# Patient Record
Sex: MALE | Race: WHITE | NOT HISPANIC OR LATINO | Employment: FULL TIME | URBAN - METROPOLITAN AREA
[De-identification: names, ages, dates, MRNs, and addresses within clinical notes are randomized per-mention and may not be internally consistent; named-entity substitution may affect disease eponyms.]

---

## 2019-03-15 ENCOUNTER — APPOINTMENT (OUTPATIENT)
Dept: GENERAL RADIOLOGY | Facility: HOSPITAL | Age: 49
End: 2019-03-15

## 2019-03-15 ENCOUNTER — HOSPITAL ENCOUNTER (EMERGENCY)
Facility: HOSPITAL | Age: 49
Discharge: HOME | End: 2019-03-15
Attending: EMERGENCY MEDICINE | Admitting: EMERGENCY MEDICINE

## 2019-03-15 VITALS
HEIGHT: 67 IN | TEMPERATURE: 98.6 F | HEART RATE: 64 BPM | WEIGHT: 172 LBS | SYSTOLIC BLOOD PRESSURE: 121 MMHG | DIASTOLIC BLOOD PRESSURE: 83 MMHG | OXYGEN SATURATION: 97 % | BODY MASS INDEX: 27 KG/M2 | RESPIRATION RATE: 16 BRPM

## 2019-03-15 DIAGNOSIS — R07.9 CHEST PAIN, UNSPECIFIED TYPE: Primary | ICD-10-CM

## 2019-03-15 LAB
ALBUMIN SERPL-MCNC: 4.84 G/DL (ref 3.2–4.8)
ALBUMIN/GLOB SERPL: 2 G/DL (ref 1.5–2.5)
ALP SERPL-CCNC: 62 U/L (ref 25–100)
ALT SERPL W P-5'-P-CCNC: 18 U/L (ref 7–40)
ANION GAP SERPL CALCULATED.3IONS-SCNC: 10 MMOL/L (ref 3–11)
AST SERPL-CCNC: 20 U/L (ref 0–33)
BASOPHILS # BLD AUTO: 0.08 10*3/MM3 (ref 0–0.2)
BASOPHILS NFR BLD AUTO: 1 % (ref 0–1)
BILIRUB SERPL-MCNC: 1 MG/DL (ref 0.3–1.2)
BNP SERPL-MCNC: 5 PG/ML (ref 0–100)
BUN BLD-MCNC: 14 MG/DL (ref 9–23)
BUN/CREAT SERPL: 14.4 (ref 7–25)
CALCIUM SPEC-SCNC: 10 MG/DL (ref 8.7–10.4)
CHLORIDE SERPL-SCNC: 105 MMOL/L (ref 99–109)
CO2 SERPL-SCNC: 25 MMOL/L (ref 20–31)
CREAT BLD-MCNC: 0.97 MG/DL (ref 0.6–1.3)
DEPRECATED RDW RBC AUTO: 38.3 FL (ref 37–54)
EOSINOPHIL # BLD AUTO: 0.06 10*3/MM3 (ref 0–0.3)
EOSINOPHIL NFR BLD AUTO: 0.8 % (ref 0–3)
ERYTHROCYTE [DISTWIDTH] IN BLOOD BY AUTOMATED COUNT: 12.1 % (ref 11.3–14.5)
GFR SERPL CREATININE-BSD FRML MDRD: 82 ML/MIN/1.73
GLOBULIN UR ELPH-MCNC: 2.5 GM/DL
GLUCOSE BLD-MCNC: 97 MG/DL (ref 70–100)
HCT VFR BLD AUTO: 45.7 % (ref 38.9–50.9)
HGB BLD-MCNC: 16 G/DL (ref 13.1–17.5)
HOLD SPECIMEN: NORMAL
HOLD SPECIMEN: NORMAL
IMM GRANULOCYTES # BLD AUTO: 0.03 10*3/MM3 (ref 0–0.05)
IMM GRANULOCYTES NFR BLD AUTO: 0.4 % (ref 0–0.6)
LIPASE SERPL-CCNC: 43 U/L (ref 6–51)
LYMPHOCYTES # BLD AUTO: 2.28 10*3/MM3 (ref 0.6–4.8)
LYMPHOCYTES NFR BLD AUTO: 29.6 % (ref 24–44)
MCH RBC QN AUTO: 30.4 PG (ref 27–31)
MCHC RBC AUTO-ENTMCNC: 35 G/DL (ref 32–36)
MCV RBC AUTO: 86.9 FL (ref 80–99)
MONOCYTES # BLD AUTO: 0.46 10*3/MM3 (ref 0–1)
MONOCYTES NFR BLD AUTO: 6 % (ref 0–12)
NEUTROPHILS # BLD AUTO: 4.8 10*3/MM3 (ref 1.5–8.3)
NEUTROPHILS NFR BLD AUTO: 62.2 % (ref 41–71)
PLATELET # BLD AUTO: 242 10*3/MM3 (ref 150–450)
PMV BLD AUTO: 9.2 FL (ref 6–12)
POTASSIUM BLD-SCNC: 3.9 MMOL/L (ref 3.5–5.5)
PROT SERPL-MCNC: 7.3 G/DL (ref 5.7–8.2)
RBC # BLD AUTO: 5.26 10*6/MM3 (ref 4.2–5.76)
SODIUM BLD-SCNC: 140 MMOL/L (ref 132–146)
TROPONIN I SERPL-MCNC: <0.006 NG/ML
WBC NRBC COR # BLD: 7.71 10*3/MM3 (ref 3.5–10.8)
WHOLE BLOOD HOLD SPECIMEN: NORMAL
WHOLE BLOOD HOLD SPECIMEN: NORMAL

## 2019-03-15 PROCEDURE — 71045 X-RAY EXAM CHEST 1 VIEW: CPT

## 2019-03-15 PROCEDURE — 85025 COMPLETE CBC W/AUTO DIFF WBC: CPT | Performed by: EMERGENCY MEDICINE

## 2019-03-15 PROCEDURE — 84484 ASSAY OF TROPONIN QUANT: CPT | Performed by: EMERGENCY MEDICINE

## 2019-03-15 PROCEDURE — 80053 COMPREHEN METABOLIC PANEL: CPT | Performed by: EMERGENCY MEDICINE

## 2019-03-15 PROCEDURE — 83880 ASSAY OF NATRIURETIC PEPTIDE: CPT | Performed by: EMERGENCY MEDICINE

## 2019-03-15 PROCEDURE — 93005 ELECTROCARDIOGRAM TRACING: CPT | Performed by: EMERGENCY MEDICINE

## 2019-03-15 PROCEDURE — 83690 ASSAY OF LIPASE: CPT | Performed by: EMERGENCY MEDICINE

## 2019-03-15 PROCEDURE — 99285 EMERGENCY DEPT VISIT HI MDM: CPT

## 2019-03-15 RX ORDER — AMLODIPINE BESYLATE AND BENAZEPRIL HYDROCHLORIDE 5; 10 MG/1; MG/1
1 CAPSULE ORAL DAILY
COMMUNITY
End: 2022-07-06 | Stop reason: SDUPTHER

## 2019-03-15 RX ORDER — ASPIRIN 81 MG/1
324 TABLET, CHEWABLE ORAL ONCE
Status: COMPLETED | OUTPATIENT
Start: 2019-03-15 | End: 2019-03-15

## 2019-03-15 RX ORDER — SODIUM CHLORIDE 0.9 % (FLUSH) 0.9 %
10 SYRINGE (ML) INJECTION AS NEEDED
Status: DISCONTINUED | OUTPATIENT
Start: 2019-03-15 | End: 2019-03-15 | Stop reason: HOSPADM

## 2019-03-15 RX ADMIN — ASPIRIN 81 MG 243 MG: 81 TABLET ORAL at 10:00

## 2019-03-15 NOTE — ED PROVIDER NOTES
Subjective   Patient presents to the emergency department today complaining of having chest pain on and off for over 3 weeks.  Patient reports the pain has no idea what is been causing the pain it does not seem to be exacerbated with exertion or alleviated with rest necessarily.  He does have a history of reflux but lying flat does not seem to make this worse.  The pain does not radiate up into his neck or down into his arm.  He does ice occasionally associated with some nausea but does not have shortness of breath.  He had no previous cardiac testing.  He has no family history of cardiac disease under the age of 60.  He smoked greater than 4 years ago.  Does not use recreational drugs including crack or cocaine.  He has no pain at this time.  When the pain is present it usually last for several hours initially his pain this morning started at about 3 AM.        History provided by:  Patient   used: No    Chest Pain   Pain location:  Substernal area  Pain quality: dull    Pain radiates to:  Does not radiate  Onset quality:  Gradual  Timing:  Intermittent  Progression:  Waxing and waning  Chronicity:  New  Context: not breathing, not lifting, not movement, not at rest and not stress    Relieved by:  Nothing  Worsened by:  Nothing  Ineffective treatments:  None tried  Associated symptoms: heartburn    Associated symptoms: no abdominal pain, no anorexia, no anxiety, no back pain, no claudication, no cough, no dysphagia, no fever, no headache, no lower extremity edema, no nausea, no near-syncope, no numbness, no palpitations, no syncope and no weakness    Risk factors: male sex    Risk factors: no birth control, not obese and no smoking        Review of Systems   Constitutional: Negative for chills and fever.   HENT: Negative for trouble swallowing.    Respiratory: Negative for cough, chest tightness and wheezing.    Cardiovascular: Positive for chest pain. Negative for palpitations, claudication,  syncope and near-syncope.   Gastrointestinal: Positive for heartburn. Negative for abdominal pain, anorexia and nausea.   Genitourinary: Negative for dysuria, frequency and urgency.   Musculoskeletal: Negative for back pain and neck pain.   Skin: Negative for pallor and rash.   Neurological: Negative for weakness, numbness and headaches.   Hematological: Negative for adenopathy.   Psychiatric/Behavioral: Negative.    All other systems reviewed and are negative.      Past Medical History:   Diagnosis Date   • Cat scratch fever    • Hypertension        Allergies   Allergen Reactions   • Morphine GI Intolerance       History reviewed. No pertinent surgical history.    History reviewed. No pertinent family history.    Social History     Socioeconomic History   • Marital status:      Spouse name: Not on file   • Number of children: Not on file   • Years of education: Not on file   • Highest education level: Not on file   Tobacco Use   • Smoking status: Former Smoker   Substance and Sexual Activity   • Alcohol use: Yes     Comment: occasionally   • Drug use: No   • Sexual activity: Defer           Objective   Physical Exam   Constitutional: He is oriented to person, place, and time. He appears well-developed and well-nourished.   HENT:   Head: Normocephalic and atraumatic.   Right Ear: External ear normal.   Left Ear: External ear normal.   Nose: Nose normal.   Mouth/Throat: Oropharynx is clear and moist.   Eyes: Conjunctivae and EOM are normal. Pupils are equal, round, and reactive to light. No scleral icterus.   Neck: Normal range of motion. No thyromegaly present.   Cardiovascular: Normal rate, regular rhythm, normal heart sounds and normal pulses.   Pulmonary/Chest: Effort normal and breath sounds normal. No respiratory distress. He has no decreased breath sounds. He has no wheezes. He has no rhonchi. He has no rales. He exhibits no tenderness.   Abdominal: Soft. Bowel sounds are normal. He exhibits no  distension. There is no tenderness.   Musculoskeletal: Normal range of motion.   Lymphadenopathy:     He has no cervical adenopathy.   Neurological: He is alert and oriented to person, place, and time. He has normal reflexes. He displays normal reflexes. No cranial nerve deficit. Coordination normal.   Skin: Skin is warm and dry.   Psychiatric: He has a normal mood and affect. His behavior is normal. Judgment and thought content normal.   Nursing note and vitals reviewed.      Procedures           ED Course        Recent Results (from the past 24 hour(s))   Comprehensive Metabolic Panel    Collection Time: 03/15/19  9:57 AM   Result Value Ref Range    Glucose 97 70 - 100 mg/dL    BUN 14 9 - 23 mg/dL    Creatinine 0.97 0.60 - 1.30 mg/dL    Sodium 140 132 - 146 mmol/L    Potassium 3.9 3.5 - 5.5 mmol/L    Chloride 105 99 - 109 mmol/L    CO2 25.0 20.0 - 31.0 mmol/L    Calcium 10.0 8.7 - 10.4 mg/dL    Total Protein 7.3 5.7 - 8.2 g/dL    Albumin 4.84 (H) 3.20 - 4.80 g/dL    ALT (SGPT) 18 7 - 40 U/L    AST (SGOT) 20 0 - 33 U/L    Alkaline Phosphatase 62 25 - 100 U/L    Total Bilirubin 1.0 0.3 - 1.2 mg/dL    eGFR Non African Amer 82 >60 mL/min/1.73    Globulin 2.5 gm/dL    A/G Ratio 2.0 1.5 - 2.5 g/dL    BUN/Creatinine Ratio 14.4 7.0 - 25.0    Anion Gap 10.0 3.0 - 11.0 mmol/L   Lipase    Collection Time: 03/15/19  9:57 AM   Result Value Ref Range    Lipase 43 6 - 51 U/L   BNP    Collection Time: 03/15/19  9:57 AM   Result Value Ref Range    BNP 5.0 0.0 - 100.0 pg/mL   Light Blue Top    Collection Time: 03/15/19  9:57 AM   Result Value Ref Range    Extra Tube hold for add-on    Green Top (Gel)    Collection Time: 03/15/19  9:57 AM   Result Value Ref Range    Extra Tube Hold for add-ons.    Lavender Top    Collection Time: 03/15/19  9:57 AM   Result Value Ref Range    Extra Tube hold for add-on    Gold Top - SST    Collection Time: 03/15/19  9:57 AM   Result Value Ref Range    Extra Tube Hold for add-ons.    CBC Auto  Differential    Collection Time: 03/15/19  9:57 AM   Result Value Ref Range    WBC 7.71 3.50 - 10.80 10*3/mm3    RBC 5.26 4.20 - 5.76 10*6/mm3    Hemoglobin 16.0 13.1 - 17.5 g/dL    Hematocrit 45.7 38.9 - 50.9 %    MCV 86.9 80.0 - 99.0 fL    MCH 30.4 27.0 - 31.0 pg    MCHC 35.0 32.0 - 36.0 g/dL    RDW 12.1 11.3 - 14.5 %    RDW-SD 38.3 37.0 - 54.0 fl    MPV 9.2 6.0 - 12.0 fL    Platelets 242 150 - 450 10*3/mm3    Neutrophil % 62.2 41.0 - 71.0 %    Lymphocyte % 29.6 24.0 - 44.0 %    Monocyte % 6.0 0.0 - 12.0 %    Eosinophil % 0.8 0.0 - 3.0 %    Basophil % 1.0 0.0 - 1.0 %    Immature Grans % 0.4 0.0 - 0.6 %    Neutrophils, Absolute 4.80 1.50 - 8.30 10*3/mm3    Lymphocytes, Absolute 2.28 0.60 - 4.80 10*3/mm3    Monocytes, Absolute 0.46 0.00 - 1.00 10*3/mm3    Eosinophils, Absolute 0.06 0.00 - 0.30 10*3/mm3    Basophils, Absolute 0.08 0.00 - 0.20 10*3/mm3    Immature Grans, Absolute 0.03 0.00 - 0.05 10*3/mm3   Troponin    Collection Time: 03/15/19  9:57 AM   Result Value Ref Range    Troponin I <0.006 <=0.039 ng/mL     Note: In addition to lab results from this visit, the labs listed above may include labs taken at another facility or during a different encounter within the last 24 hours. Please correlate lab times with ED admission and discharge times for further clarification of the services performed during this visit.    XR Chest 1 View   Final Result   No acute finding.       D:  03/15/2019   E:  03/15/2019       This report was finalized on 3/15/2019 10:09 AM by Wander Harris.            Vitals:    03/15/19 1230 03/15/19 1231 03/15/19 1300 03/15/19 1330   BP: 123/81  114/81 121/83   BP Location:       Patient Position:       Pulse:  72 67 64   Resp:   16    Temp:       TempSrc:       SpO2:  98% 98% 97%   Weight:       Height:         Medications   aspirin chewable tablet 324 mg (243 mg Oral Given 3/15/19 1000)     ECG/EMG Results (last 24 hours)     Procedure Component Value Units Date/Time    ECG 12 Lead [[id number]]  Collected:  03/15/19 0928     Updated:  03/15/19 0957    Narrative:       Test Reason : chest pain  Blood Pressure : **/** mmHG  Vent. Rate : 068 BPM     Atrial Rate : 068 BPM     P-R Int : 136 ms          QRS Dur : 088 ms      QT Int : 400 ms       P-R-T Axes : 046 021 027 degrees     QTc Int : 425 ms    Normal sinus rhythm with sinus arrhythmia  No previous ECGs available  Confirmed by RENATO HUMPHREYS MD ([id number]) on 3/15/2019 9:57:23 AM    Referred By:  ED MD           Confirmed By:RENATO HUMPHREYS MD        ECG 12 Lead   Final Result   Test Reason : chest pain   Blood Pressure : **/** mmHG   Vent. Rate : 068 BPM     Atrial Rate : 068 BPM      P-R Int : 136 ms          QRS Dur : 088 ms       QT Int : 400 ms       P-R-T Axes : 046 021 027 degrees      QTc Int : 425 ms      Normal sinus rhythm with sinus arrhythmia   No previous ECGs available   Confirmed by RENATO HUMPHREYS MD ([id number]) on 3/15/2019 9:57:23 AM      Referred By:  ED MD           Confirmed By:RENATO HUMPHREYS MD                HEART Score (for prediction of 6-week risk of major adverse cardiac event) reviewed and/or performed as part of the patient evaluation and treatment planning process.  The result associated with this review/performance is: 2       MDM  Number of Diagnoses or Management Options  Chest pain, unspecified type: new and requires workup     Amount and/or Complexity of Data Reviewed  Clinical lab tests: reviewed and ordered  Tests in the radiology section of CPT®: reviewed and ordered  Tests in the medicine section of CPT®: reviewed and ordered  Discuss the patient with other providers: yes    Patient Progress  Patient progress: stable        Final diagnoses:   Chest pain, unspecified type            Reid Booker PA  03/16/19 0714

## 2019-03-20 ENCOUNTER — OFFICE VISIT (OUTPATIENT)
Dept: CARDIOLOGY | Facility: HOSPITAL | Age: 49
End: 2019-03-20

## 2019-03-20 ENCOUNTER — HOSPITAL ENCOUNTER (OUTPATIENT)
Dept: CARDIOLOGY | Facility: HOSPITAL | Age: 49
Discharge: HOME | End: 2019-03-20
Admitting: NURSE PRACTITIONER

## 2019-03-20 VITALS
DIASTOLIC BLOOD PRESSURE: 79 MMHG | HEIGHT: 67 IN | BODY MASS INDEX: 26.84 KG/M2 | TEMPERATURE: 97.1 F | OXYGEN SATURATION: 99 % | WEIGHT: 171 LBS | HEART RATE: 92 BPM | SYSTOLIC BLOOD PRESSURE: 121 MMHG | RESPIRATION RATE: 16 BRPM

## 2019-03-20 DIAGNOSIS — R07.89 CHEST PAIN, ATYPICAL: Primary | ICD-10-CM

## 2019-03-20 DIAGNOSIS — I10 ESSENTIAL HYPERTENSION: ICD-10-CM

## 2019-03-20 DIAGNOSIS — R10.13 DYSPEPSIA: ICD-10-CM

## 2019-03-20 DIAGNOSIS — R07.89 CHEST PAIN, ATYPICAL: ICD-10-CM

## 2019-03-20 LAB
BH CV STRESS BP STAGE 1: NORMAL
BH CV STRESS BP STAGE 2: NORMAL
BH CV STRESS BP STAGE 3: NORMAL
BH CV STRESS DURATION MIN STAGE 1: 3
BH CV STRESS DURATION MIN STAGE 2: 3
BH CV STRESS DURATION MIN STAGE 3: 3
BH CV STRESS DURATION MIN STAGE 4: 0
BH CV STRESS DURATION SEC STAGE 1: 0
BH CV STRESS DURATION SEC STAGE 2: 0
BH CV STRESS DURATION SEC STAGE 3: 0
BH CV STRESS DURATION SEC STAGE 4: 36
BH CV STRESS GRADE STAGE 1: 10
BH CV STRESS GRADE STAGE 2: 12
BH CV STRESS GRADE STAGE 3: 14
BH CV STRESS GRADE STAGE 4: 16
BH CV STRESS HR STAGE 1: 139
BH CV STRESS HR STAGE 2: 162
BH CV STRESS HR STAGE 3: 176
BH CV STRESS HR STAGE 4: 187
BH CV STRESS METS STAGE 1: 5
BH CV STRESS METS STAGE 2: 7.5
BH CV STRESS METS STAGE 3: 10
BH CV STRESS METS STAGE 4: 13.5
BH CV STRESS PROTOCOL 1: NORMAL
BH CV STRESS RECOVERY BP: NORMAL MMHG
BH CV STRESS RECOVERY HR: 117 BPM
BH CV STRESS SPEED STAGE 1: 1.7
BH CV STRESS SPEED STAGE 2: 2.5
BH CV STRESS SPEED STAGE 3: 3.4
BH CV STRESS SPEED STAGE 4: 4.2
BH CV STRESS STAGE 1: 1
BH CV STRESS STAGE 2: 2
BH CV STRESS STAGE 3: 3
BH CV STRESS STAGE 4: 4
MAXIMAL PREDICTED HEART RATE: 171 BPM
PERCENT MAX PREDICTED HR: 109.36 %
STRESS BASELINE BP: NORMAL MMHG
STRESS BASELINE HR: 82 BPM
STRESS PERCENT HR: 129 %
STRESS POST ESTIMATED WORKLOAD: 11 METS
STRESS POST EXERCISE DUR MIN: 9 MIN
STRESS POST EXERCISE DUR SEC: 36 SEC
STRESS POST PEAK BP: NORMAL MMHG
STRESS POST PEAK HR: 187 BPM
STRESS TARGET HR: 145 BPM

## 2019-03-20 PROCEDURE — 93017 CV STRESS TEST TRACING ONLY: CPT

## 2019-03-20 PROCEDURE — 93018 CV STRESS TEST I&R ONLY: CPT | Performed by: INTERNAL MEDICINE

## 2019-03-20 PROCEDURE — 99214 OFFICE O/P EST MOD 30 MIN: CPT | Performed by: NURSE PRACTITIONER

## 2019-03-20 RX ORDER — ASPIRIN 81 MG/1
81 TABLET, CHEWABLE ORAL DAILY
COMMUNITY
End: 2019-03-29 | Stop reason: HOSPADM

## 2019-03-20 RX ORDER — OMEPRAZOLE 20 MG/1
20 CAPSULE, DELAYED RELEASE ORAL DAILY PRN
COMMUNITY
End: 2022-07-06 | Stop reason: SDUPTHER

## 2019-03-20 NOTE — PROGRESS NOTES
[facility]  Heart and Valve Center      Encounter Date:03/20/2019     Sunil Nolasco  [address] [place] [address]  [unfilled]    1970    Arnulfo Snowden MD    Sunil Nolasco is a 49 y.o. male.      Subjective:     Chief Complaint:  Chest Pain (s/p ED visit)       HPI     49-year-old male presented to [facility] ED on 3/15/2019 with complaints of chest pain for greater than 3 weeks. Pressure. Associated fatigue over the last several weeks.  Not exacerbated by exertion or relieved with rest. Pt reports BP has been elevated (150/98)  Normally well controlled. History of GERD, but symptoms are not worse when lying down.  Denies radiation of chest pain.  Reports occasional nausea.  Denies dyspnea.  No history of previous cardiac testing.  No family history of premature CAD in first-degree relative.  History of tobacco use greater than 4 years. Pt continued to [place]  until ED visit.    Denies recreational drug use, excessive alcohol intake.  When pain is present will usually last for several hours.      Cardiac risk factors:  History of  hypertension  Tobacco use (former), gender           History reviewed. No pertinent surgical history.    Allergies   Allergen Reactions   • Morphine GI Intolerance         Current Outpatient Medications:   •  aspirin 81 MG chewable tablet, Chew 81 mg Daily., Disp: , Rfl:   •  omeprazole (priLOSEC) 20 MG capsule, Take 20 mg by mouth Daily As Needed., Disp: , Rfl:   •  amLODIPine-benazepril (LOTREL 5-10) 5-10 MG per capsule, Take 1 capsule by mouth Daily., Disp: , Rfl:     The following portions of the patient's history were reviewed and updated as appropriate: allergies, current medications, past family history, past medical history, past social history, past surgical history and problem list.    Review of Systems   Constitution: Positive for malaise/fatigue.   Cardiovascular: Positive for chest pain.   Gastrointestinal: Positive for heartburn.  "  Psychiatric/Behavioral: The patient is nervous/anxious.    All other systems reviewed and are negative.      Objective:     Vitals:    03/20/19 0948 03/20/19 0952 03/20/19 0953   BP: 120/81 128/82 121/79   BP Location: Right arm Left arm Left arm   Patient Position: Sitting Sitting Standing   Pulse: 69  92   Resp: 16     Temp: 97.1 °F (36.2 °C)     TempSrc: Temporal     SpO2: 99%     Weight: 77.6 kg (171 lb)     Height: 170.2 cm (67\")           Physical Exam   Constitutional: He is oriented to person, place, and time. He appears well-developed and well-nourished. No distress.   HENT:   Head: Normocephalic and atraumatic.   Mouth/Throat: Oropharynx is clear and moist.   Eyes: Conjunctivae are normal. Pupils are equal, round, and reactive to light. No scleral icterus.   Neck: No hepatojugular reflux and no JVD present. Carotid bruit is not present. No tracheal deviation present. No thyromegaly present.   Cardiovascular: Normal rate, regular rhythm, normal heart sounds and intact distal pulses. Exam reveals no friction rub.   No murmur heard.  Pulmonary/Chest: Effort normal and breath sounds normal.   Abdominal: Soft. Bowel sounds are normal. He exhibits no distension. There is no tenderness.   Musculoskeletal: He exhibits no edema.   Lymphadenopathy:     He has no cervical adenopathy.   Neurological: He is alert and oriented to person, place, and time.   Skin: Skin is warm, dry and intact. No rash noted. No cyanosis or erythema. No pallor.   Psychiatric: He has a normal mood and affect. His behavior is normal. Thought content normal.   Vitals reviewed.      Lab and Diagnostic Review:  Admission on 03/15/2019, Discharged on 03/15/2019   Component Date Value Ref Range Status   • Glucose 03/15/2019 97  70 - 100 mg/dL Final   • BUN 03/15/2019 14  9 - 23 mg/dL Final   • Creatinine 03/15/2019 0.97  0.60 - 1.30 mg/dL Final   • Sodium 03/15/2019 140  132 - 146 mmol/L Final   • Potassium 03/15/2019 3.9  3.5 - 5.5 mmol/L " Final   • Chloride 03/15/2019 105  99 - 109 mmol/L Final   • CO2 03/15/2019 25.0  20.0 - 31.0 mmol/L Final   • Calcium 03/15/2019 10.0  8.7 - 10.4 mg/dL Final   • Total Protein 03/15/2019 7.3  5.7 - 8.2 g/dL Final   • Albumin 03/15/2019 4.84* 3.20 - 4.80 g/dL Final   • ALT (SGPT) 03/15/2019 18  7 - 40 U/L Final   • AST (SGOT) 03/15/2019 20  0 - 33 U/L Final   • Alkaline Phosphatase 03/15/2019 62  25 - 100 U/L Final   • Total Bilirubin 03/15/2019 1.0  0.3 - 1.2 mg/dL Final   • eGFR Non African Amer 03/15/2019 82  >60 mL/min/1.73 Final   • Globulin 03/15/2019 2.5  gm/dL Final   • A/G Ratio 03/15/2019 2.0  1.5 - 2.5 g/dL Final   • BUN/Creatinine Ratio 03/15/2019 14.4  7.0 - 25.0 Final   • Anion Gap 03/15/2019 10.0  3.0 - 11.0 mmol/L Final   • Lipase 03/15/2019 43  6 - 51 U/L Final   • BNP 03/15/2019 5.0  0.0 - 100.0 pg/mL Final    Results may be falsely decreased if patient taking Biotin.   • Extra Tube 03/15/2019 hold for add-on   Final    Auto resulted   • Extra Tube 03/15/2019 Hold for add-ons.   Final    Auto resulted.   • Extra Tube 03/15/2019 hold for add-on   Final    Auto resulted   • Extra Tube 03/15/2019 Hold for add-ons.   Final    Auto resulted.   • WBC 03/15/2019 7.71  3.50 - 10.80 10*3/mm3 Final   • RBC 03/15/2019 5.26  4.20 - 5.76 10*6/mm3 Final   • Hemoglobin 03/15/2019 16.0  13.1 - 17.5 g/dL Final   • Hematocrit 03/15/2019 45.7  38.9 - 50.9 % Final   • MCV 03/15/2019 86.9  80.0 - 99.0 fL Final   • MCH 03/15/2019 30.4  27.0 - 31.0 pg Final   • MCHC 03/15/2019 35.0  32.0 - 36.0 g/dL Final   • RDW 03/15/2019 12.1  11.3 - 14.5 % Final   • RDW-SD 03/15/2019 38.3  37.0 - 54.0 fl Final   • MPV 03/15/2019 9.2  6.0 - 12.0 fL Final   • Platelets 03/15/2019 242  150 - 450 10*3/mm3 Final   • Neutrophil % 03/15/2019 62.2  41.0 - 71.0 % Final   • Lymphocyte % 03/15/2019 29.6  24.0 - 44.0 % Final   • Monocyte % 03/15/2019 6.0  0.0 - 12.0 % Final   • Eosinophil % 03/15/2019 0.8  0.0 - 3.0 % Final   • Basophil %  03/15/2019 1.0  0.0 - 1.0 % Final   • Immature Grans % 03/15/2019 0.4  0.0 - 0.6 % Final   • Neutrophils, Absolute 03/15/2019 4.80  1.50 - 8.30 10*3/mm3 Final   • Lymphocytes, Absolute 03/15/2019 2.28  0.60 - 4.80 10*3/mm3 Final   • Monocytes, Absolute 03/15/2019 0.46  0.00 - 1.00 10*3/mm3 Final   • Eosinophils, Absolute 03/15/2019 0.06  0.00 - 0.30 10*3/mm3 Final   • Basophils, Absolute 03/15/2019 0.08  0.00 - 0.20 10*3/mm3 Final   • Immature Grans, Absolute 03/15/2019 0.03  0.00 - 0.05 10*3/mm3 Final   • Troponin I 03/15/2019 <0.006  <=0.039 ng/mL Final    Results may be falsely decreased if patient taking Biotin.     EKG 3/15/2019: Normal sinus rhythm with sinus arrhythmia 68 bpm    Chest x-ray 3/15/2019: No acute cardiopulmonary processes  Assessment and Plan:         1. Chest pain, atypical    - Treadmill Stress Test; Future    2. Essential hypertension  Controlled today  Amlodipine-benazepril  Continue home BP and HR log  If elevated will adjust BP meds  Discussed BP goals    3. Dyspepsia  Instructed to use PPI daily    F/u pending test results.         *Please note that portions of this note were completed with a voice recognition program. Efforts were made to edit the dictations, but occasionally words are mistranscribed.

## 2019-03-21 DIAGNOSIS — R94.39 ABNORMAL STRESS ECG WITH TREADMILL: ICD-10-CM

## 2019-03-21 DIAGNOSIS — R07.89 CHEST PAIN, ATYPICAL: Primary | ICD-10-CM

## 2019-03-21 RX ORDER — ATORVASTATIN CALCIUM 40 MG/1
40 TABLET, FILM COATED ORAL NIGHTLY
Qty: 30 TABLET | Refills: 1 | Status: SHIPPED | OUTPATIENT
Start: 2019-03-21 | End: 2019-03-29 | Stop reason: HOSPADM

## 2019-03-21 RX ORDER — METOPROLOL SUCCINATE 25 MG/1
25 TABLET, EXTENDED RELEASE ORAL DAILY
Qty: 30 TABLET | Refills: 1 | Status: SHIPPED | OUTPATIENT
Start: 2019-03-21 | End: 2019-05-16 | Stop reason: SDUPTHER

## 2019-03-21 NOTE — PROGRESS NOTES
Called and reviewed stress test results and Dr. Howard's recommendations.    Stress test abnormal.  Pt agrees to scheduled LHC.  Pt will be started on statin and BB.      [facility] will contact to schedule LHC

## 2019-03-25 ENCOUNTER — PREP FOR SURGERY (OUTPATIENT)
Dept: OTHER | Facility: HOSPITAL | Age: 49
End: 2019-03-25

## 2019-03-25 DIAGNOSIS — R94.39 ABNORMAL STRESS TEST: Primary | ICD-10-CM

## 2019-03-25 RX ORDER — SODIUM CHLORIDE 0.9 % (FLUSH) 0.9 %
3 SYRINGE (ML) INJECTION EVERY 12 HOURS SCHEDULED
Status: CANCELLED | OUTPATIENT
Start: 2019-03-25

## 2019-03-25 RX ORDER — SODIUM CHLORIDE 9 MG/ML
3 INJECTION, SOLUTION INTRAVENOUS CONTINUOUS
Status: CANCELLED | OUTPATIENT
Start: 2019-03-25 | End: 2019-03-25

## 2019-03-25 RX ORDER — ONDANSETRON 2 MG/ML
4 INJECTION INTRAMUSCULAR; INTRAVENOUS EVERY 6 HOURS PRN
Status: CANCELLED | OUTPATIENT
Start: 2019-03-25

## 2019-03-25 RX ORDER — LIDOCAINE HYDROCHLORIDE 10 MG/ML
0.1 INJECTION, SOLUTION EPIDURAL; INFILTRATION; INTRACAUDAL; PERINEURAL ONCE AS NEEDED
Status: CANCELLED | OUTPATIENT
Start: 2019-03-25

## 2019-03-25 RX ORDER — ASPIRIN 81 MG/1
324 TABLET, CHEWABLE ORAL ONCE
Status: CANCELLED | OUTPATIENT
Start: 2019-03-25 | End: 2019-03-25

## 2019-03-25 RX ORDER — SODIUM CHLORIDE 0.9 % (FLUSH) 0.9 %
3-10 SYRINGE (ML) INJECTION AS NEEDED
Status: CANCELLED | OUTPATIENT
Start: 2019-03-25

## 2019-03-25 RX ORDER — ASPIRIN 81 MG/1
81 TABLET ORAL DAILY
Status: CANCELLED | OUTPATIENT
Start: 2019-03-26

## 2019-03-29 ENCOUNTER — TELEPHONE (OUTPATIENT)
Dept: CARDIOLOGY | Facility: CLINIC | Age: 49
End: 2019-03-29

## 2019-03-29 ENCOUNTER — APPOINTMENT (OUTPATIENT)
Dept: CARDIOLOGY | Facility: HOSPITAL | Age: 49
End: 2019-03-29

## 2019-03-29 ENCOUNTER — HOSPITAL ENCOUNTER (OUTPATIENT)
Facility: HOSPITAL | Age: 49
Discharge: HOME | End: 2019-03-29
Attending: INTERNAL MEDICINE | Admitting: INTERNAL MEDICINE

## 2019-03-29 VITALS
OXYGEN SATURATION: 94 % | DIASTOLIC BLOOD PRESSURE: 72 MMHG | HEIGHT: 67 IN | BODY MASS INDEX: 26.37 KG/M2 | WEIGHT: 168 LBS | SYSTOLIC BLOOD PRESSURE: 108 MMHG | HEART RATE: 63 BPM | RESPIRATION RATE: 18 BRPM

## 2019-03-29 DIAGNOSIS — R07.89 CHEST PAIN, ATYPICAL: ICD-10-CM

## 2019-03-29 DIAGNOSIS — R94.39 ABNORMAL STRESS ECG WITH TREADMILL: ICD-10-CM

## 2019-03-29 DIAGNOSIS — R94.39 ABNORMAL STRESS TEST: ICD-10-CM

## 2019-03-29 LAB
ANION GAP SERPL CALCULATED.3IONS-SCNC: 9 MMOL/L (ref 3–11)
ARTICHOKE IGE QN: 37 MG/DL (ref 0–130)
BH CV ECHO MEAS - AO ROOT AREA (BSA CORRECTED): 1.7
BH CV ECHO MEAS - AO ROOT AREA: 7.8 CM^2
BH CV ECHO MEAS - AO ROOT DIAM: 3.2 CM
BH CV ECHO MEAS - BSA(HAYCOCK): 1.9 M^2
BH CV ECHO MEAS - BSA: 1.9 M^2
BH CV ECHO MEAS - BZI_BMI: 26.3 KILOGRAMS/M^2
BH CV ECHO MEAS - BZI_METRIC_HEIGHT: 170.2 CM
BH CV ECHO MEAS - BZI_METRIC_WEIGHT: 76.2 KG
BH CV ECHO MEAS - EDV(CUBED): 76.2 ML
BH CV ECHO MEAS - EDV(MOD-SP2): 77 ML
BH CV ECHO MEAS - EDV(MOD-SP4): 71 ML
BH CV ECHO MEAS - EDV(TEICH): 80.3 ML
BH CV ECHO MEAS - EF(CUBED): 75.9 %
BH CV ECHO MEAS - EF(MOD-BP): 64 %
BH CV ECHO MEAS - EF(MOD-SP2): 64.9 %
BH CV ECHO MEAS - EF(MOD-SP4): 62 %
BH CV ECHO MEAS - EF(TEICH): 68.3 %
BH CV ECHO MEAS - ESV(CUBED): 18.3 ML
BH CV ECHO MEAS - ESV(MOD-SP2): 27 ML
BH CV ECHO MEAS - ESV(MOD-SP4): 27 ML
BH CV ECHO MEAS - ESV(TEICH): 25.5 ML
BH CV ECHO MEAS - FS: 37.8 %
BH CV ECHO MEAS - IVS/LVPW: 0.99
BH CV ECHO MEAS - IVSD: 0.78 CM
BH CV ECHO MEAS - LAD MAJOR: 4.1 CM
BH CV ECHO MEAS - LAT PEAK E' VEL: 11.6 CM/SEC
BH CV ECHO MEAS - LATERAL E/E' RATIO: 6.1
BH CV ECHO MEAS - LV DIASTOLIC VOL/BSA (35-75): 37.8 ML/M^2
BH CV ECHO MEAS - LV MASS(C)D: 100.7 GRAMS
BH CV ECHO MEAS - LV MASS(C)DI: 53.6 GRAMS/M^2
BH CV ECHO MEAS - LV SYSTOLIC VOL/BSA (12-30): 14.4 ML/M^2
BH CV ECHO MEAS - LVIDD: 4.2 CM
BH CV ECHO MEAS - LVIDS: 2.6 CM
BH CV ECHO MEAS - LVLD AP2: 8.1 CM
BH CV ECHO MEAS - LVLD AP4: 8.4 CM
BH CV ECHO MEAS - LVLS AP2: 7.2 CM
BH CV ECHO MEAS - LVLS AP4: 7 CM
BH CV ECHO MEAS - LVPWD: 0.79 CM
BH CV ECHO MEAS - MED PEAK E' VEL: 10.5 CM/SEC
BH CV ECHO MEAS - MEDIAL E/E' RATIO: 6.8
BH CV ECHO MEAS - MV A MAX VEL: 55.8 CM/SEC
BH CV ECHO MEAS - MV DEC TIME: 0.3 SEC
BH CV ECHO MEAS - MV E MAX VEL: 72.1 CM/SEC
BH CV ECHO MEAS - MV E/A: 1.3
BH CV ECHO MEAS - PA ACC SLOPE: 532.7 CM/SEC^2
BH CV ECHO MEAS - PA ACC TIME: 0.11 SEC
BH CV ECHO MEAS - PA PR(ACCEL): 31.5 MMHG
BH CV ECHO MEAS - PI END-D VEL: 74.7 CM/SEC
BH CV ECHO MEAS - SI(CUBED): 30.8 ML/M^2
BH CV ECHO MEAS - SI(MOD-SP2): 26.6 ML/M^2
BH CV ECHO MEAS - SI(MOD-SP4): 23.4 ML/M^2
BH CV ECHO MEAS - SI(TEICH): 29.2 ML/M^2
BH CV ECHO MEAS - SV(CUBED): 57.8 ML
BH CV ECHO MEAS - SV(MOD-SP2): 50 ML
BH CV ECHO MEAS - SV(MOD-SP4): 44 ML
BH CV ECHO MEAS - SV(TEICH): 54.8 ML
BH CV ECHO MEAS - TAPSE (>1.6): 2.2 CM2
BH CV ECHO MEASUREMENTS AVERAGE E/E' RATIO: 6.52
BH CV VAS BP RIGHT ARM: NORMAL MMHG
BH CV XLRA - RV BASE: 3.5 CM
BH CV XLRA - RV LENGTH: 7.7 CM
BH CV XLRA - RV MID: 3.3 CM
BH CV XLRA - TDI S': 12.5 CM/SEC
BUN BLD-MCNC: 12 MG/DL (ref 9–23)
BUN/CREAT SERPL: 12.5 (ref 7–25)
CALCIUM SPEC-SCNC: 9.6 MG/DL (ref 8.7–10.4)
CHLORIDE SERPL-SCNC: 104 MMOL/L (ref 99–109)
CHOLEST SERPL-MCNC: 96 MG/DL (ref 0–200)
CO2 SERPL-SCNC: 27 MMOL/L (ref 20–31)
CREAT BLD-MCNC: 0.96 MG/DL (ref 0.6–1.3)
DEPRECATED RDW RBC AUTO: 39.5 FL (ref 37–54)
ERYTHROCYTE [DISTWIDTH] IN BLOOD BY AUTOMATED COUNT: 12.4 % (ref 11.3–14.5)
GFR SERPL CREATININE-BSD FRML MDRD: 83 ML/MIN/1.73
GLUCOSE BLD-MCNC: 95 MG/DL (ref 70–100)
HCT VFR BLD AUTO: 43.6 % (ref 38.9–50.9)
HDLC SERPL-MCNC: 45 MG/DL (ref 40–60)
HGB BLD-MCNC: 14.8 G/DL (ref 13.1–17.5)
LEFT ATRIUM VOLUME INDEX: 18.1 ML/M^2
LEFT ATRIUM VOLUME: 34 ML
MCH RBC QN AUTO: 30.3 PG (ref 27–31)
MCHC RBC AUTO-ENTMCNC: 33.9 G/DL (ref 32–36)
MCV RBC AUTO: 89.2 FL (ref 80–99)
PLATELET # BLD AUTO: 250 10*3/MM3 (ref 150–450)
PMV BLD AUTO: 10.3 FL (ref 6–12)
POTASSIUM BLD-SCNC: 3.7 MMOL/L (ref 3.5–5.5)
RBC # BLD AUTO: 4.89 10*6/MM3 (ref 4.2–5.76)
SODIUM BLD-SCNC: 140 MMOL/L (ref 132–146)
TRIGL SERPL-MCNC: 45 MG/DL (ref 0–150)
WBC NRBC COR # BLD: 8.09 10*3/MM3 (ref 3.5–10.8)

## 2019-03-29 PROCEDURE — 93458 L HRT ARTERY/VENTRICLE ANGIO: CPT | Performed by: INTERNAL MEDICINE

## 2019-03-29 PROCEDURE — 85027 COMPLETE CBC AUTOMATED: CPT | Performed by: NURSE PRACTITIONER

## 2019-03-29 PROCEDURE — 80061 LIPID PANEL: CPT | Performed by: NURSE PRACTITIONER

## 2019-03-29 PROCEDURE — 93010 ELECTROCARDIOGRAM REPORT: CPT | Performed by: INTERNAL MEDICINE

## 2019-03-29 PROCEDURE — 93306 TTE W/DOPPLER COMPLETE: CPT

## 2019-03-29 PROCEDURE — C1894 INTRO/SHEATH, NON-LASER: HCPCS | Performed by: INTERNAL MEDICINE

## 2019-03-29 PROCEDURE — 25010000002 FENTANYL CITRATE (PF) 100 MCG/2ML SOLUTION: Performed by: INTERNAL MEDICINE

## 2019-03-29 PROCEDURE — 25010000002 MIDAZOLAM PER 1 MG: Performed by: INTERNAL MEDICINE

## 2019-03-29 PROCEDURE — 93306 TTE W/DOPPLER COMPLETE: CPT | Performed by: INTERNAL MEDICINE

## 2019-03-29 PROCEDURE — C1769 GUIDE WIRE: HCPCS | Performed by: INTERNAL MEDICINE

## 2019-03-29 PROCEDURE — 25010000002 HEPARIN (PORCINE) PER 1000 UNITS: Performed by: INTERNAL MEDICINE

## 2019-03-29 PROCEDURE — 36415 COLL VENOUS BLD VENIPUNCTURE: CPT

## 2019-03-29 PROCEDURE — 93005 ELECTROCARDIOGRAM TRACING: CPT | Performed by: NURSE PRACTITIONER

## 2019-03-29 PROCEDURE — 0 IOPAMIDOL PER 1 ML: Performed by: INTERNAL MEDICINE

## 2019-03-29 PROCEDURE — 80048 BASIC METABOLIC PNL TOTAL CA: CPT | Performed by: NURSE PRACTITIONER

## 2019-03-29 RX ORDER — LIDOCAINE HYDROCHLORIDE 10 MG/ML
INJECTION, SOLUTION EPIDURAL; INFILTRATION; INTRACAUDAL; PERINEURAL AS NEEDED
Status: DISCONTINUED | OUTPATIENT
Start: 2019-03-29 | End: 2019-03-29 | Stop reason: HOSPADM

## 2019-03-29 RX ORDER — SODIUM CHLORIDE 9 MG/ML
3 INJECTION, SOLUTION INTRAVENOUS CONTINUOUS
Status: ACTIVE | OUTPATIENT
Start: 2019-03-29 | End: 2019-03-29

## 2019-03-29 RX ORDER — ASPIRIN 81 MG/1
81 TABLET ORAL DAILY
Status: DISCONTINUED | OUTPATIENT
Start: 2019-03-30 | End: 2019-03-29 | Stop reason: HOSPADM

## 2019-03-29 RX ORDER — MIDAZOLAM HYDROCHLORIDE 1 MG/ML
INJECTION INTRAMUSCULAR; INTRAVENOUS AS NEEDED
Status: DISCONTINUED | OUTPATIENT
Start: 2019-03-29 | End: 2019-03-29 | Stop reason: HOSPADM

## 2019-03-29 RX ORDER — SODIUM CHLORIDE 0.9 % (FLUSH) 0.9 %
3-10 SYRINGE (ML) INJECTION AS NEEDED
Status: DISCONTINUED | OUTPATIENT
Start: 2019-03-29 | End: 2019-03-29 | Stop reason: HOSPADM

## 2019-03-29 RX ORDER — ONDANSETRON 2 MG/ML
4 INJECTION INTRAMUSCULAR; INTRAVENOUS EVERY 6 HOURS PRN
Status: DISCONTINUED | OUTPATIENT
Start: 2019-03-29 | End: 2019-03-29 | Stop reason: HOSPADM

## 2019-03-29 RX ORDER — LIDOCAINE HYDROCHLORIDE 10 MG/ML
0.1 INJECTION, SOLUTION EPIDURAL; INFILTRATION; INTRACAUDAL; PERINEURAL ONCE AS NEEDED
Status: DISCONTINUED | OUTPATIENT
Start: 2019-03-29 | End: 2019-03-29 | Stop reason: HOSPADM

## 2019-03-29 RX ORDER — FENTANYL CITRATE 50 UG/ML
INJECTION, SOLUTION INTRAMUSCULAR; INTRAVENOUS AS NEEDED
Status: DISCONTINUED | OUTPATIENT
Start: 2019-03-29 | End: 2019-03-29 | Stop reason: HOSPADM

## 2019-03-29 RX ORDER — SODIUM CHLORIDE 0.9 % (FLUSH) 0.9 %
3 SYRINGE (ML) INJECTION EVERY 12 HOURS SCHEDULED
Status: DISCONTINUED | OUTPATIENT
Start: 2019-03-29 | End: 2019-03-29 | Stop reason: HOSPADM

## 2019-03-29 RX ORDER — ASPIRIN 81 MG/1
324 TABLET, CHEWABLE ORAL ONCE
Status: COMPLETED | OUTPATIENT
Start: 2019-03-29 | End: 2019-03-29

## 2019-03-29 RX ADMIN — SODIUM CHLORIDE 3 ML/KG/HR: 9 INJECTION, SOLUTION INTRAVENOUS at 07:47

## 2019-03-29 RX ADMIN — ASPIRIN 81 MG 324 MG: 81 TABLET ORAL at 07:47

## 2019-03-29 NOTE — TELEPHONE ENCOUNTER
----- Message from Reid Howard III, MD sent at 3/29/2019  2:51 PM EDT -----  Heart function is normal and your echo.  No evidence of increased wall thickening.  Continue current medical therapy and routine follow-up with your primary care physician.

## 2019-03-29 NOTE — TELEPHONE ENCOUNTER
Patient notified of heart function is normal on your echo. No evidence of increased wall thickening. Continue current medical therapy and routine f/u with your primary care physician. He verbalized understanding.

## 2019-05-02 ENCOUNTER — TELEPHONE (OUTPATIENT)
Dept: CARDIOLOGY | Facility: HOSPITAL | Age: 49
End: 2019-05-02

## 2019-05-02 NOTE — TELEPHONE ENCOUNTER
----- Message from GONZALO Rodriguez sent at 4/30/2019  8:21 AM EDT -----  Regarding: RE: Side effects of metoprolol   Continue at this time.  (please transfer note to chart)  ----- Message -----  From: Lorelei Smallwood [facility]  Sent: 4/26/2019   2:09 PM  To: GONZALO Rodriguez  Subject: Side effects of metoprolol                       I called Mr. Nolasco and said that he is feeling much better on the metoprolol succinate and his BP today was 122/85 which is great. He is slightly concerned regarding some of the side effects including: irritability, increased diarrhea (h/o IBS), sexual dysfunction, and fatigue. I explained that these can all be common side effects of beta blockers and if they become very bothersome we could maybe try a different beta blocker to see if any improve. He isn't overly concerned and has an appointment with a provider on 6/27 to check on his BP. I told him I would let you know when you got back on Tuesday and would let him know if you wanted him to continue his current medication since he is doing well otherwise or if you wanted him to change the medication.     ----- Message -----  From: Carlie Felix MA  Sent: 4/26/2019   9:07 AM  To: Lorelei Smallwood JOSSELYN    [facility] and [facility] Telephone Note for:    Sunil Nolasco  Home Phone      [phone]      Reason for Call:  Pt has a medication question.

## 2019-05-16 DIAGNOSIS — R07.89 CHEST PAIN, ATYPICAL: ICD-10-CM

## 2019-05-16 DIAGNOSIS — R94.39 ABNORMAL STRESS ECG WITH TREADMILL: ICD-10-CM

## 2019-05-16 RX ORDER — METOPROLOL SUCCINATE 25 MG/1
25 TABLET, EXTENDED RELEASE ORAL DAILY
Qty: 30 TABLET | Refills: 1 | Status: SHIPPED | OUTPATIENT
Start: 2019-05-16 | End: 2022-07-06 | Stop reason: SDUPTHER

## 2019-05-16 NOTE — TELEPHONE ENCOUNTER
Patient seen on 3/20/19 and is following up next month with provider to recheck BP issues. He is going to run out of his medication prior to that office visit and [facility]. Refill was sent for metoprolol succinate 25mg daily.     Lorelei Smallwood, [place]

## 2022-03-31 ENCOUNTER — TELEPHONE (OUTPATIENT)
Dept: FAMILY MEDICINE CLINIC | Facility: CLINIC | Age: 52
End: 2022-03-31

## 2022-06-28 ENCOUNTER — TELEPHONE (OUTPATIENT)
Dept: FAMILY MEDICINE CLINIC | Facility: CLINIC | Age: 52
End: 2022-06-28

## 2022-06-28 DIAGNOSIS — R73.9 HYPERGLYCEMIA: ICD-10-CM

## 2022-06-28 DIAGNOSIS — Z12.5 PROSTATE CANCER SCREENING: ICD-10-CM

## 2022-06-28 DIAGNOSIS — I10 HYPERTENSION, ESSENTIAL: ICD-10-CM

## 2022-06-28 DIAGNOSIS — Z00.00 GENERAL MEDICAL EXAM: Primary | ICD-10-CM

## 2022-06-28 NOTE — TELEPHONE ENCOUNTER
Patient would like lab orders to be placed so he can have labs drawn on 07/05.     Please call patient when this is completed.

## 2022-06-28 NOTE — TELEPHONE ENCOUNTER
Lab order in his chart - but he needs to make sure it is drawn AFTER 7/2/22 - so his insurance will pay for his yearly PSA

## 2022-07-05 ENCOUNTER — LAB (OUTPATIENT)
Dept: FAMILY MEDICINE CLINIC | Facility: CLINIC | Age: 52
End: 2022-07-05

## 2022-07-05 DIAGNOSIS — Z12.5 PROSTATE CANCER SCREENING: ICD-10-CM

## 2022-07-05 DIAGNOSIS — Z00.00 GENERAL MEDICAL EXAM: ICD-10-CM

## 2022-07-05 DIAGNOSIS — R73.9 HYPERGLYCEMIA: ICD-10-CM

## 2022-07-05 DIAGNOSIS — I10 HYPERTENSION, ESSENTIAL: ICD-10-CM

## 2022-07-05 PROCEDURE — 36415 COLL VENOUS BLD VENIPUNCTURE: CPT | Performed by: FAMILY MEDICINE

## 2022-07-06 ENCOUNTER — OFFICE VISIT (OUTPATIENT)
Dept: FAMILY MEDICINE CLINIC | Facility: CLINIC | Age: 52
End: 2022-07-06

## 2022-07-06 VITALS
DIASTOLIC BLOOD PRESSURE: 76 MMHG | BODY MASS INDEX: 25.58 KG/M2 | HEIGHT: 67 IN | OXYGEN SATURATION: 96 % | SYSTOLIC BLOOD PRESSURE: 114 MMHG | WEIGHT: 163 LBS | HEART RATE: 73 BPM

## 2022-07-06 DIAGNOSIS — M62.838 MUSCLE SPASMS OF NECK: ICD-10-CM

## 2022-07-06 DIAGNOSIS — I10 HYPERTENSION, ESSENTIAL: ICD-10-CM

## 2022-07-06 DIAGNOSIS — R94.39 ABNORMAL STRESS ECG WITH TREADMILL: ICD-10-CM

## 2022-07-06 DIAGNOSIS — K21.9 GERD WITHOUT ESOPHAGITIS: ICD-10-CM

## 2022-07-06 DIAGNOSIS — R07.89 CHEST PAIN, ATYPICAL: ICD-10-CM

## 2022-07-06 DIAGNOSIS — Z12.11 SCREENING FOR COLON CANCER: ICD-10-CM

## 2022-07-06 DIAGNOSIS — Z12.2 ENCOUNTER FOR SCREENING FOR LUNG CANCER: ICD-10-CM

## 2022-07-06 DIAGNOSIS — Z12.5 PROSTATE CANCER SCREENING: ICD-10-CM

## 2022-07-06 DIAGNOSIS — Z00.00 GENERAL MEDICAL EXAM: Primary | ICD-10-CM

## 2022-07-06 DIAGNOSIS — Z87.891 PERSONAL HISTORY OF TOBACCO USE: ICD-10-CM

## 2022-07-06 LAB
ALBUMIN SERPL-MCNC: 4.7 G/DL (ref 3.8–4.9)
ALBUMIN/GLOB SERPL: 1.9 {RATIO} (ref 1.2–2.2)
ALP SERPL-CCNC: 96 IU/L (ref 44–121)
ALT SERPL-CCNC: 14 IU/L (ref 0–44)
AST SERPL-CCNC: 18 IU/L (ref 0–40)
BASOPHILS # BLD AUTO: 0.1 X10E3/UL (ref 0–0.2)
BASOPHILS NFR BLD AUTO: 1 %
BILIRUB SERPL-MCNC: 0.5 MG/DL (ref 0–1.2)
BUN SERPL-MCNC: 14 MG/DL (ref 6–24)
BUN/CREAT SERPL: 14 (ref 9–20)
CALCIUM SERPL-MCNC: 9.8 MG/DL (ref 8.7–10.2)
CHLORIDE SERPL-SCNC: 104 MMOL/L (ref 96–106)
CHOLEST SERPL-MCNC: 187 MG/DL (ref 100–199)
CO2 SERPL-SCNC: 24 MMOL/L (ref 20–29)
CREAT SERPL-MCNC: 1.03 MG/DL (ref 0.76–1.27)
EGFRCR SERPLBLD CKD-EPI 2021: 87 ML/MIN/1.73
EOSINOPHIL # BLD AUTO: 0.4 X10E3/UL (ref 0–0.4)
EOSINOPHIL NFR BLD AUTO: 4 %
ERYTHROCYTE [DISTWIDTH] IN BLOOD BY AUTOMATED COUNT: 12.2 % (ref 11.6–15.4)
GLOBULIN SER CALC-MCNC: 2.5 G/DL (ref 1.5–4.5)
GLUCOSE SERPL-MCNC: 88 MG/DL (ref 65–99)
HBA1C MFR BLD: 5.6 % (ref 4.8–5.6)
HCT VFR BLD AUTO: 50.5 % (ref 37.5–51)
HDLC SERPL-MCNC: 52 MG/DL
HGB BLD-MCNC: 17 G/DL (ref 13–17.7)
IMM GRANULOCYTES # BLD AUTO: 0 X10E3/UL (ref 0–0.1)
IMM GRANULOCYTES NFR BLD AUTO: 0 %
LDLC SERPL CALC-MCNC: 123 MG/DL (ref 0–99)
LYMPHOCYTES # BLD AUTO: 2.2 X10E3/UL (ref 0.7–3.1)
LYMPHOCYTES NFR BLD AUTO: 23 %
MCH RBC QN AUTO: 31.4 PG (ref 26.6–33)
MCHC RBC AUTO-ENTMCNC: 33.7 G/DL (ref 31.5–35.7)
MCV RBC AUTO: 93 FL (ref 79–97)
MONOCYTES # BLD AUTO: 0.6 X10E3/UL (ref 0.1–0.9)
MONOCYTES NFR BLD AUTO: 6 %
NEUTROPHILS # BLD AUTO: 6.3 X10E3/UL (ref 1.4–7)
NEUTROPHILS NFR BLD AUTO: 66 %
PLATELET # BLD AUTO: 242 X10E3/UL (ref 150–450)
POTASSIUM SERPL-SCNC: 5.1 MMOL/L (ref 3.5–5.2)
PROT SERPL-MCNC: 7.2 G/DL (ref 6–8.5)
PSA SERPL-MCNC: 1 NG/ML (ref 0–4)
RBC # BLD AUTO: 5.41 X10E6/UL (ref 4.14–5.8)
SODIUM SERPL-SCNC: 144 MMOL/L (ref 134–144)
TRIGL SERPL-MCNC: 65 MG/DL (ref 0–149)
TSH SERPL DL<=0.005 MIU/L-ACNC: 1.34 UIU/ML (ref 0.45–4.5)
VLDLC SERPL CALC-MCNC: 12 MG/DL (ref 5–40)
WBC # BLD AUTO: 9.6 X10E3/UL (ref 3.4–10.8)

## 2022-07-06 PROCEDURE — 99396 PREV VISIT EST AGE 40-64: CPT | Performed by: FAMILY MEDICINE

## 2022-07-06 RX ORDER — CYCLOBENZAPRINE HCL 10 MG
5-10 TABLET ORAL 3 TIMES DAILY PRN
Qty: 30 TABLET | Refills: 5 | Status: SHIPPED | OUTPATIENT
Start: 2022-07-06

## 2022-07-06 RX ORDER — OMEPRAZOLE 20 MG/1
20 CAPSULE, DELAYED RELEASE ORAL DAILY PRN
Qty: 90 CAPSULE | Refills: 1 | Status: SHIPPED | OUTPATIENT
Start: 2022-07-06 | End: 2023-01-03 | Stop reason: SDUPTHER

## 2022-07-06 RX ORDER — AMLODIPINE BESYLATE AND BENAZEPRIL HYDROCHLORIDE 5; 10 MG/1; MG/1
1 CAPSULE ORAL DAILY
Qty: 90 CAPSULE | Refills: 1 | Status: SHIPPED | OUTPATIENT
Start: 2022-07-06 | End: 2023-01-03 | Stop reason: SDUPTHER

## 2022-07-06 RX ORDER — HYDROCORTISONE 25 MG/G
CREAM TOPICAL 2 TIMES DAILY PRN
Qty: 28 G | Refills: 11 | Status: SHIPPED | OUTPATIENT
Start: 2022-07-06

## 2022-07-06 RX ORDER — METOPROLOL SUCCINATE 25 MG/1
25 TABLET, EXTENDED RELEASE ORAL DAILY
Qty: 90 TABLET | Refills: 1 | Status: SHIPPED | OUTPATIENT
Start: 2022-07-06 | End: 2023-01-03 | Stop reason: SDUPTHER

## 2022-07-06 NOTE — PROGRESS NOTES
"Chief Complaint  Hypertension    Subjective    History of Present Illness:  Sunil Nolasco is a 52 y.o. male who presents today for physical exam and medication refills.    Has been doing well since our last visit together and January.  Is fasting to get blood work up-to-date.  Last PSA for prostate cancer screening returned normal July 2022  Still past due for colon cancer screening and will contact us when ready to have this scheduled.  He plans to do this later this fall.  Declines Cologuard given active hemorrhoid issues and concerns for false positive testing.    We did discuss low-dose lung CT given his pack-year smoking history.  He voiced understanding but declines at this time    [facility] (copied for chart update and review):    He continues with lotrel and metoprolol for BP control. Good Cardiology workup in 2019 given atypical chest pain episodes. No episodes of chest pain since workup and restart on omeprazole for GERD. Still using omeprazole prn for flareups but not needing it everyday. No dysphagia.     Health Maint: TDAP 6/2009. TD booster 6/2019. Seasonal flu shot uptodate. HepA series completed in 2018 Pneumovax 23 given in the past with smoking history. Completed covid19 series.     No family history of colon cancer. Past due for screening colonoscopy - discussed again today... he wants to wait until things calm with covid and will call back when ready to schedule.    Intermittent hemorrhoid flareups stable with anusol use prn.           Objective   Vital Signs:   /76 (BP Location: Left arm, Patient Position: Sitting, Cuff Size: Large Adult)   Pulse 73   Ht 170.2 cm (67\")   Wt 73.9 kg (163 lb)   SpO2 96%   BMI 25.53 kg/m²     Review of Systems   Constitutional: Negative for appetite change, chills and fever.   HENT: Negative for hearing loss.    Eyes: Negative for blurred vision.   Respiratory: Negative for chest tightness and shortness " of breath.    Cardiovascular: Negative for chest pain and palpitations.   Gastrointestinal: Negative for abdominal pain.        Recurrent hemorrhoid flareup stable with Anusol   Musculoskeletal: Negative for gait problem and neck pain.   Skin: Negative for rash.   Psychiatric/Behavioral: Negative for depressed mood.       Past History:  Medical History: has a past medical history of Allergic rhinitis, Anxiety, Benign essential hypertension, Cat scratch fever, Gastroesophageal reflux, Hyperlipidemia, Hypertension, Irritable bowel syndrome, and PONV (postoperative nausea and vomiting).   Surgical History: has a past surgical history that includes [place] tooth extraction and Cardiac catheterization (N/A, 3/29/2019).   Family History: family history includes Coronary artery disease in his mother; Diabetes in his father; Hypertension in his mother.   Social History: reports that he quit smoking about 8 years ago. He has a 45.00 pack-year smoking history. He has quit using smokeless tobacco. He reports current alcohol use. He reports that he does not use drugs.      Current Outpatient Medications:   •  amLODIPine-benazepril (LOTREL 5-10) 5-10 MG per capsule, Take 1 capsule by mouth Daily., Disp: 90 capsule, Rfl: 1  •  metoprolol succinate XL (TOPROL-XL) 25 MG 24 hr tablet, Take 1 tablet by mouth Daily., Disp: 90 tablet, Rfl: 1  •  omeprazole (priLOSEC) 20 MG capsule, Take 1 capsule by mouth Daily As Needed (heartburn)., Disp: 90 capsule, Rfl: 1  •  cyclobenzaprine (FLEXERIL) 10 MG tablet, Take 0.5-1 tablets by mouth 3 (Three) Times a Day As Needed for Muscle Spasms., Disp: 30 tablet, Rfl: 5  •  Hydrocortisone, Perianal, (Anusol-HC) 2.5 % rectal cream, Insert  into the rectum 2 (Two) Times a Day As Needed for Hemorrhoids., Disp: 28 g, Rfl: 11    Allergies: Morphine    Physical Exam  Constitutional:       Appearance: Normal appearance.   HENT:      Head: Normocephalic.      Right Ear: External ear normal.      Left Ear:  External ear normal.      Nose: Nose normal.   Eyes:      Pupils: Pupils are equal, round, and reactive to light.   Cardiovascular:      Rate and Rhythm: Normal rate and regular rhythm.      Heart sounds: Normal heart sounds.   Pulmonary:      Effort: Pulmonary effort is normal.      Breath sounds: Normal breath sounds.   Musculoskeletal:         General: Normal range of motion.      Cervical back: Normal range of motion.   Skin:     General: Skin is warm and dry.   Neurological:      General: No focal deficit present.      Mental Status: He is alert.   Psychiatric:         Mood and Affect: Mood normal.         Behavior: Behavior normal.         Thought Content: Thought content normal.          Result Review                   Assessment and Plan  Diagnoses and all orders for this visit:    1. General medical exam (Primary)  Assessment & Plan:  Reviewed health maintenance, screening, and vaccinations.    Reviewed together fasting labs and normal PSA      2. Prostate cancer screening  Assessment & Plan:  Normal PSA reviewed July 2022      3. Screening for colon cancer  Assessment & Plan:  Is due for colonoscopy.  He reports he will call and get this scheduled later this fall.      4. Hypertension, essential  Assessment & Plan:  Hypertension is improving with treatment.  Continue current treatment regimen.  Blood pressure will be reassessed at the next regular appointment.    Orders:  -     amLODIPine-benazepril (LOTREL 5-10) 5-10 MG per capsule; Take 1 capsule by mouth Daily.  Dispense: 90 capsule; Refill: 1  -     metoprolol succinate XL (TOPROL-XL) 25 MG 24 hr tablet; Take 1 tablet by mouth Daily.  Dispense: 90 tablet; Refill: 1    5. GERD without esophagitis  Assessment & Plan:  GERD doing well with use of omeprazole as needed.    When he quit smoking his reflux did improve significantly.  No dysphagia.  Treatment of his reflux has resolved episodes in the past of noncardiac chest pain.    Orders:  -     omeprazole  (priLOSEC) 20 MG capsule; Take 1 capsule by mouth Daily As Needed (heartburn).  Dispense: 90 capsule; Refill: 1    6. Chest pain, atypical  -     metoprolol succinate XL (TOPROL-XL) 25 MG 24 hr tablet; Take 1 tablet by mouth Daily.  Dispense: 90 tablet; Refill: 1    7. Abnormal stress ECG with treadmill  -     metoprolol succinate XL (TOPROL-XL) 25 MG 24 hr tablet; Take 1 tablet by mouth Daily.  Dispense: 90 tablet; Refill: 1    8. Personal history of tobacco use  Assessment & Plan:  Discussed and recommended low-dose lung CT for lung cancer screening.  He voiced understanding and would like to hold off at this point for the lung cancer screening we will continue to encourage this at future visits      9. Encounter for screening for lung cancer    10. Muscle spasms of neck  -     cyclobenzaprine (FLEXERIL) 10 MG tablet; Take 0.5-1 tablets by mouth 3 (Three) Times a Day As Needed for Muscle Spasms.  Dispense: 30 tablet; Refill: 5    Other orders  -     Hydrocortisone, Perianal, (Anusol-HC) 2.5 % rectal cream; Insert  into the rectum 2 (Two) Times a Day As Needed for Hemorrhoids.  Dispense: 28 g; Refill: 11      BMI is >= 25 and <30. (Overweight) The following options were offered after discussion;: weight loss educational material (shared in after visit summary) and exercise counseling/recommendations          Follow Up  Return in 6 months (on 1/6/2023) for Med recheck.    Patient was given instructions and counseling regarding his condition or for health maintenance advice. Please see specific information pulled into the AVS if appropriate.     Arnulfo Snowden MD

## 2022-07-06 NOTE — ASSESSMENT & PLAN NOTE
GERD doing well with use of omeprazole as needed.    When he quit smoking his reflux did improve significantly.  No dysphagia.  Treatment of his reflux has resolved episodes in the past of noncardiac chest pain.

## 2022-07-06 NOTE — ASSESSMENT & PLAN NOTE
Discussed and recommended low-dose lung CT for lung cancer screening.  He voiced understanding and would like to hold off at this point for the lung cancer screening we will continue to encourage this at future visits

## 2022-07-06 NOTE — ASSESSMENT & PLAN NOTE
Reviewed health maintenance, screening, and vaccinations.    Reviewed together fasting labs and normal PSA

## 2023-01-03 ENCOUNTER — OFFICE VISIT (OUTPATIENT)
Dept: FAMILY MEDICINE CLINIC | Facility: CLINIC | Age: 53
End: 2023-01-03
Payer: COMMERCIAL

## 2023-01-03 VITALS
OXYGEN SATURATION: 98 % | HEIGHT: 67 IN | DIASTOLIC BLOOD PRESSURE: 82 MMHG | WEIGHT: 163 LBS | BODY MASS INDEX: 25.58 KG/M2 | SYSTOLIC BLOOD PRESSURE: 124 MMHG | HEART RATE: 72 BPM

## 2023-01-03 DIAGNOSIS — Z12.11 SCREENING FOR COLON CANCER: ICD-10-CM

## 2023-01-03 DIAGNOSIS — M62.830 BACK MUSCLE SPASM: ICD-10-CM

## 2023-01-03 DIAGNOSIS — R07.89 CHEST PAIN, ATYPICAL: ICD-10-CM

## 2023-01-03 DIAGNOSIS — K21.9 GERD WITHOUT ESOPHAGITIS: Chronic | ICD-10-CM

## 2023-01-03 DIAGNOSIS — K64.9 HEMORRHOIDS, UNSPECIFIED HEMORRHOID TYPE: ICD-10-CM

## 2023-01-03 DIAGNOSIS — I10 HYPERTENSION, ESSENTIAL: Primary | Chronic | ICD-10-CM

## 2023-01-03 PROBLEM — R94.39 ABNORMAL STRESS ECG WITH TREADMILL: Status: RESOLVED | Noted: 2019-03-21 | Resolved: 2023-01-03

## 2023-01-03 PROCEDURE — 99214 OFFICE O/P EST MOD 30 MIN: CPT | Performed by: FAMILY MEDICINE

## 2023-01-03 RX ORDER — METOPROLOL SUCCINATE 25 MG/1
25 TABLET, EXTENDED RELEASE ORAL DAILY
Qty: 90 TABLET | Refills: 2 | Status: SHIPPED | OUTPATIENT
Start: 2023-01-03

## 2023-01-03 RX ORDER — OMEPRAZOLE 20 MG/1
20 CAPSULE, DELAYED RELEASE ORAL DAILY PRN
Qty: 90 CAPSULE | Refills: 2 | Status: SHIPPED | OUTPATIENT
Start: 2023-01-03

## 2023-01-03 RX ORDER — AMLODIPINE BESYLATE AND BENAZEPRIL HYDROCHLORIDE 5; 10 MG/1; MG/1
1 CAPSULE ORAL DAILY
Qty: 90 CAPSULE | Refills: 2 | Status: SHIPPED | OUTPATIENT
Start: 2023-01-03

## 2023-01-03 NOTE — PROGRESS NOTES
Chief Complaint  Hypertension (6 month follow up)    Subjective    History of Present Illness:  Sunil Nolasco is a 52 y.o. male who presents today for 6-month follow-up and medication refills.    Doing well since our last visit in July for his physical.    Ready to get set up for past-due colonoscopy.  He would like this done with colorectal surgery to discuss his ongoing hemorrhoid issues.    Fasting labs up-to-date in July.  He would like to wait until her next visit to get additional blood work done.    Declines hep C screening.    Declines low-dose lung CT for lung cancer screening.  He did quit smoking about 6 years ago.    Discussed Shingrix and COVID booster.  He voiced understanding but declined    Review of past chart data (copied and edited for historical review and chart update):    Last PSA for prostate cancer screening returned normal July 2023    Still past due for colon cancer screening.  See above-ready to schedule.  Declines Cologuard given active hemorrhoid issues and concerns for false positive testing.     We did discuss low-dose lung CT given his pack-year smoking history.  He voiced understanding but declines at this time     He continues with lotrel and metoprolol for BP control. Good Cardiology workup in 2019 given atypical chest pain episodes. No episodes of chest pain since workup and restart on omeprazole for GERD. Still using omeprazole prn for flareups but not needing it everyday. No dysphagia.      Health Maint: TDAP 6/2009. TD booster 6/2019. HepA series completed in 2018 Pneumovax 23 given in the past with smoking history.        Intermittent hemorrhoid flareups stable with anusol use prn.          Objective   Vital Signs:   /82 (BP Location: Left arm, Patient Position: Sitting, Cuff Size: Adult)   Pulse 72   Ht 170.2 cm (67\")   Wt 73.9 kg (163 lb)   SpO2 98%   BMI 25.53 kg/m²     Review of Systems   Constitutional: Negative for appetite change, chills and fever.   HENT:  Negative for hearing loss.    Eyes: Negative for blurred vision.   Respiratory: Negative for chest tightness.    Cardiovascular: Negative for chest pain.   Gastrointestinal: Negative for abdominal pain.   Musculoskeletal: Negative for gait problem.   Skin: Negative for rash.   Psychiatric/Behavioral: Negative for depressed mood.       Past History:  Medical History: has a past medical history of Allergic rhinitis, Anxiety, Benign essential hypertension, Cat scratch fever, Gastroesophageal reflux, Hyperlipidemia, Hypertension, Irritable bowel syndrome, and PONV (postoperative nausea and vomiting).   Surgical History: has a past surgical history that includes [place] tooth extraction and Cardiac catheterization (N/A, 3/29/2019).   Family History: family history includes Coronary artery disease in his mother; Diabetes in his father; Hypertension in his mother.   Social History: reports that he quit smoking about 8 years ago. His smoking use included cigarettes. He has a 45.00 pack-year smoking history. He has quit using smokeless tobacco. He reports current alcohol use. He reports that he does not use drugs.      Current Outpatient Medications:   •  amLODIPine-benazepril (LOTREL 5-10) 5-10 MG per capsule, Take 1 capsule by mouth Daily., Disp: 90 capsule, Rfl: 2  •  cyclobenzaprine (FLEXERIL) 10 MG tablet, Take 0.5-1 tablets by mouth 3 (Three) Times a Day As Needed for Muscle Spasms., Disp: 30 tablet, Rfl: 5  •  Hydrocortisone, Perianal, (Anusol-HC) 2.5 % rectal cream, Insert  into the rectum 2 (Two) Times a Day As Needed for Hemorrhoids., Disp: 28 g, Rfl: 11  •  metoprolol succinate XL (TOPROL-XL) 25 MG 24 hr tablet, Take 1 tablet by mouth Daily., Disp: 90 tablet, Rfl: 2  •  omeprazole (priLOSEC) 20 MG capsule, Take 1 capsule by mouth Daily As Needed (heartburn)., Disp: 90 capsule, Rfl: 2    Allergies: Morphine    Physical Exam  Constitutional:       Appearance: Normal appearance.   HENT:      Head: Normocephalic.       Right Ear: External ear normal.      Left Ear: External ear normal.      Nose: Nose normal.   Eyes:      Pupils: Pupils are equal, round, and reactive to light.   Cardiovascular:      Rate and Rhythm: Normal rate and regular rhythm.      Heart sounds: Normal heart sounds.   Pulmonary:      Effort: Pulmonary effort is normal.      Breath sounds: Normal breath sounds.   Musculoskeletal:         General: Normal range of motion.      Cervical back: Normal range of motion.   Skin:     General: Skin is warm and dry.   Neurological:      General: No focal deficit present.      Mental Status: He is alert.   Psychiatric:         Mood and Affect: Mood normal.         Behavior: Behavior normal.         Thought Content: Thought content normal.          Result Review                   Assessment and Plan  Diagnoses and all orders for this visit:    1. Hypertension, essential (Primary)  Assessment & Plan:  Hypertension is improving with treatment.  Continue current treatment regimen.  Blood pressure will be reassessed at the next regular appointment.    Orders:  -     amLODIPine-benazepril (LOTREL 5-10) 5-10 MG per capsule; Take 1 capsule by mouth Daily.  Dispense: 90 capsule; Refill: 2  -     metoprolol succinate XL (TOPROL-XL) 25 MG 24 hr tablet; Take 1 tablet by mouth Daily.  Dispense: 90 tablet; Refill: 2    2. GERD without esophagitis  Assessment & Plan:  Controlled with omeprazole.  No dysphagia.    Treatment for GERD has resolved his past episodes of atypical chest pain that he did have further work-up with negative stress testing.    Orders:  -     omeprazole (priLOSEC) 20 MG capsule; Take 1 capsule by mouth Daily As Needed (heartburn).  Dispense: 90 capsule; Refill: 2    3. Screening for colon cancer  -     Ambulatory Referral For Screening Colonoscopy    4. Back muscle spasm  Assessment & Plan:  Stable with Flexeril as needed.  No refills needed today      5. Hemorrhoids, unspecified hemorrhoid type  Assessment &  Plan:  Recurrent problems with internal and external hemorrhoids.  Would like to see colorectal surgery for screening colonoscopy to also discuss hemorrhoid treatment options available    Orders:  -     Ambulatory Referral For Screening Colonoscopy    6. Chest pain, atypical  Assessment & Plan:  No further episodes after treatment for GERD.    Orders:  -     metoprolol succinate XL (TOPROL-XL) 25 MG 24 hr tablet; Take 1 tablet by mouth Daily.  Dispense: 90 tablet; Refill: 2      BMI is >= 25 and <30. (Overweight) The following options were offered after discussion;: exercise counseling/recommendations and nutrition counseling/recommendations          Follow Up  Return in about 6 months (around 7/6/2023) for Annual physical, Med recheck.    Arnulfo Snowden MD

## 2023-01-03 NOTE — ASSESSMENT & PLAN NOTE
Hypertension is improving with treatment.  Continue current treatment regimen.  Blood pressure will be reassessed at the next regular appointment.

## 2023-01-03 NOTE — ASSESSMENT & PLAN NOTE
Recurrent problems with internal and external hemorrhoids.  Would like to see colorectal surgery for screening colonoscopy to also discuss hemorrhoid treatment options available

## 2023-01-03 NOTE — ASSESSMENT & PLAN NOTE
Controlled with omeprazole.  No dysphagia.    Treatment for GERD has resolved his past episodes of atypical chest pain that he did have further work-up with negative stress testing.

## 2023-01-04 ENCOUNTER — TELEPHONE (OUTPATIENT)
Dept: FAMILY MEDICINE CLINIC | Facility: CLINIC | Age: 53
End: 2023-01-04

## 2023-04-06 ENCOUNTER — HOSPITAL ENCOUNTER (OUTPATIENT)
Dept: CARDIOLOGY | Facility: HOSPITAL | Age: 53
Discharge: HOME | End: 2023-04-06
Payer: COMMERCIAL

## 2023-04-06 ENCOUNTER — TRANSCRIBE ORDERS (OUTPATIENT)
Dept: LAB | Facility: HOSPITAL | Age: 53
End: 2023-04-06
Payer: COMMERCIAL

## 2023-04-06 ENCOUNTER — TRANSCRIBE ORDERS (OUTPATIENT)
Dept: CARDIOLOGY | Facility: HOSPITAL | Age: 53
End: 2023-04-06
Payer: COMMERCIAL

## 2023-04-06 ENCOUNTER — LAB (OUTPATIENT)
Dept: LAB | Facility: HOSPITAL | Age: 53
End: 2023-04-06
Payer: COMMERCIAL

## 2023-04-06 DIAGNOSIS — K62.89 ANAL OR RECTAL PAIN: Primary | ICD-10-CM

## 2023-04-06 DIAGNOSIS — K62.89 ANAL OR RECTAL PAIN: ICD-10-CM

## 2023-04-06 DIAGNOSIS — K62.89 HYPERTROPHIED ANAL PAPILLA: Primary | ICD-10-CM

## 2023-04-06 LAB
ANION GAP SERPL CALCULATED.3IONS-SCNC: 13.1 MMOL/L (ref 5–15)
BUN SERPL-MCNC: 13 MG/DL (ref 6–20)
BUN/CREAT SERPL: 13.7 (ref 7–25)
CALCIUM SPEC-SCNC: 9.4 MG/DL (ref 8.6–10.5)
CHLORIDE SERPL-SCNC: 100 MMOL/L (ref 98–107)
CO2 SERPL-SCNC: 23.9 MMOL/L (ref 22–29)
CREAT SERPL-MCNC: 0.95 MG/DL (ref 0.76–1.27)
EGFRCR SERPLBLD CKD-EPI 2021: 95.7 ML/MIN/1.73
GLUCOSE SERPL-MCNC: 87 MG/DL (ref 65–99)
POTASSIUM SERPL-SCNC: 3.7 MMOL/L (ref 3.5–5.2)
QT INTERVAL: 418 MS
QTC INTERVAL: 420 MS
SODIUM SERPL-SCNC: 137 MMOL/L (ref 136–145)

## 2023-04-06 PROCEDURE — 80048 BASIC METABOLIC PNL TOTAL CA: CPT

## 2023-04-06 PROCEDURE — 93005 ELECTROCARDIOGRAM TRACING: CPT

## 2023-04-06 PROCEDURE — 36415 COLL VENOUS BLD VENIPUNCTURE: CPT

## 2023-07-11 PROBLEM — M62.830 BACK MUSCLE SPASM: Status: RESOLVED | Noted: 2023-01-03 | Resolved: 2023-07-11

## 2023-07-11 PROBLEM — K64.9 HEMORRHOIDS: Status: RESOLVED | Noted: 2023-01-03 | Resolved: 2023-07-11

## 2023-07-11 PROBLEM — Z80.0 FAMILY HISTORY OF COLON CANCER: Chronic | Status: ACTIVE | Noted: 2023-07-11

## 2023-07-11 PROBLEM — Z80.0 FAMILY HISTORY OF COLON CANCER: Status: ACTIVE | Noted: 2023-07-11

## 2023-09-25 ENCOUNTER — PATIENT MESSAGE (OUTPATIENT)
Dept: FAMILY MEDICINE CLINIC | Facility: CLINIC | Age: 53
End: 2023-09-25

## 2023-09-25 NOTE — TELEPHONE ENCOUNTER
From: Sunil Nolasco  To: Arnulfo Snowden  Sent: 9/25/2023 8:30 AM EDT  Subject: PSA    I have a follow up appointment with urology on October 30 @ 1 p.m. They said they would re check my PSA. Would it be OK to cancel my PSA bloodwork on October 13 since I have a re check with them? It is a follow up visit , They wasnt worried about it , but I did have an infection that most stuff was resistant to and feel much better. Thanks

## 2024-01-08 ENCOUNTER — OFFICE VISIT (OUTPATIENT)
Dept: FAMILY MEDICINE CLINIC | Facility: CLINIC | Age: 54
End: 2024-01-08
Payer: COMMERCIAL

## 2024-01-08 VITALS
BODY MASS INDEX: 25.84 KG/M2 | DIASTOLIC BLOOD PRESSURE: 82 MMHG | HEART RATE: 75 BPM | OXYGEN SATURATION: 98 % | SYSTOLIC BLOOD PRESSURE: 142 MMHG | WEIGHT: 165 LBS

## 2024-01-08 DIAGNOSIS — E66.3 OVERWEIGHT (BMI 25.0-29.9): ICD-10-CM

## 2024-01-08 DIAGNOSIS — I10 HYPERTENSION, ESSENTIAL: Primary | Chronic | ICD-10-CM

## 2024-01-08 DIAGNOSIS — K21.9 GERD WITHOUT ESOPHAGITIS: Chronic | ICD-10-CM

## 2024-01-08 DIAGNOSIS — Z80.0 FAMILY HISTORY OF COLON CANCER: Chronic | ICD-10-CM

## 2024-01-08 DIAGNOSIS — R97.20 INCREASED PROSTATE SPECIFIC ANTIGEN (PSA) VELOCITY: ICD-10-CM

## 2024-01-08 DIAGNOSIS — N13.8 BPH WITH OBSTRUCTION/LOWER URINARY TRACT SYMPTOMS: ICD-10-CM

## 2024-01-08 DIAGNOSIS — N40.1 BPH WITH OBSTRUCTION/LOWER URINARY TRACT SYMPTOMS: ICD-10-CM

## 2024-01-08 DIAGNOSIS — N41.1 CHRONIC PROSTATITIS: ICD-10-CM

## 2024-01-08 PROCEDURE — 99214 OFFICE O/P EST MOD 30 MIN: CPT | Performed by: FAMILY MEDICINE

## 2024-01-08 RX ORDER — AMLODIPINE BESYLATE AND BENAZEPRIL HYDROCHLORIDE 5; 10 MG/1; MG/1
1 CAPSULE ORAL DAILY
Qty: 90 CAPSULE | Refills: 2 | Status: SHIPPED | OUTPATIENT
Start: 2024-01-08

## 2024-01-08 RX ORDER — TAMSULOSIN HYDROCHLORIDE 0.4 MG/1
1 CAPSULE ORAL NIGHTLY
Qty: 90 CAPSULE | Refills: 2 | Status: SHIPPED | OUTPATIENT
Start: 2024-01-08

## 2024-01-08 RX ORDER — OMEPRAZOLE 20 MG/1
20 CAPSULE, DELAYED RELEASE ORAL DAILY PRN
Qty: 90 CAPSULE | Refills: 2 | Status: SHIPPED | OUTPATIENT
Start: 2024-01-08

## 2024-01-08 RX ORDER — METOPROLOL SUCCINATE 25 MG/1
25 TABLET, EXTENDED RELEASE ORAL DAILY
Qty: 90 TABLET | Refills: 2 | Status: SHIPPED | OUTPATIENT
Start: 2024-01-08

## 2024-01-08 RX ORDER — SULFAMETHOXAZOLE AND TRIMETHOPRIM 800; 160 MG/1; MG/1
1 TABLET ORAL
COMMUNITY
Start: 2023-11-16 | End: 2024-01-08

## 2024-01-08 NOTE — PROGRESS NOTES
Chief Complaint  Hypertension and Heartburn    Subjective    History of Present Illness:  Sunil Nolasco is a 53 y.o. male who presents today for 6 month followup visit and medication refills.     He has been doing well since our last visit in July for his physical.    Uptodate with colonoscopy in January 2023 with Dr. Rodríguez.  He did have a polyp removed that returned hyperplastic so recommendation was made to repeat his colonoscopy in 10 years.  Unfortunately, after his screening colonoscopy his brother was diagnosed with stage IV colon cancer.  He does understand that he is now due for repeat screening colonoscopy in 5 years due to his brother's history of colon cancer (Jan 2028).     He is on Flomax given mild BPH discovered on rectal exam during his colonoscopy and urinary hesitancy.       Declines hep C screening.     Declines low-dose lung CT for lung cancer screening.  He did quit smoking about 8 years ago.     Discussed Shingrix and COVID booster.  He voiced understanding but declined      Last PSA for prostate cancer screening returned normal July 2023 but had increased compared to past labs - he did see Urology and did have prostatitis treated with 3 wks of bactrim (after repeat PSA was up to 3.61 in Oct 2023) - willing for recheck on PSA today and if it has not improved he has followup planned with urology.      He continues with lotrel and metoprolol for BP control. A little stressed with urology visits/PSA increase today.      Good Cardiology workup in 2019 given atypical chest pain episodes. No episodes of chest pain since workup and restart on omeprazole for GERD. Still using omeprazole prn for flareups but not needing it everyday. No dysphagia.             Objective   Vital Signs:   /82   Pulse 75   Wt 74.8 kg (165 lb)   SpO2 98%   BMI 25.84 kg/m²     Review of Systems   Constitutional:  Negative for appetite change, chills and fever.   HENT:  Negative for hearing loss.    Eyes:  Negative  for blurred vision.   Respiratory:  Negative for chest tightness.    Cardiovascular:  Negative for chest pain.   Gastrointestinal:  Negative for abdominal pain.   Musculoskeletal:  Negative for gait problem.   Skin:  Negative for rash.   Psychiatric/Behavioral:  Negative for depressed mood.        Past History:  Medical History: has a past medical history of Allergic rhinitis, Anxiety, Arthritis (2005), Benign essential hypertension, Cat scratch fever, Colon polyp (2023), Diverticulosis (2023), Gastroesophageal reflux, HL (hearing loss) (2010), Hyperlipidemia, Hypertension, Irritable bowel syndrome, Kidney stone (1997), PONV (postoperative nausea and vomiting), and Tremor (1985).   Surgical History: has a past surgical history that includes [place] tooth extraction; Cardiac catheterization (N/A, 03/29/2019); Lymph node biopsy (1985); and Colonoscopy (2023).   Family History: family history includes Cancer in his brother; Coronary artery disease in his mother; Diabetes in his father; Hypertension in his mother.   Social History: reports that he quit smoking about 9 years ago. His smoking use included cigarettes. He has a 45.00 pack-year smoking history. He has quit using smokeless tobacco. He reports current alcohol use of about 1.0 standard drink of alcohol per week. He reports that he does not use drugs.      Current Outpatient Medications:     amLODIPine-benazepril (LOTREL 5-10) 5-10 MG per capsule, Take 1 capsule by mouth Daily., Disp: 90 capsule, Rfl: 2    metoprolol succinate XL (TOPROL-XL) 25 MG 24 hr tablet, Take 1 tablet by mouth Daily., Disp: 90 tablet, Rfl: 2    omeprazole (priLOSEC) 20 MG capsule, Take 1 capsule by mouth Daily As Needed (heartburn)., Disp: 90 capsule, Rfl: 2    tamsulosin (FLOMAX) 0.4 MG capsule 24 hr capsule, Take 1 capsule by mouth Every Night., Disp: 90 capsule, Rfl: 2    Allergies: Morphine    Physical Exam  Constitutional:       Appearance: Normal appearance.   HENT:      Head:  Normocephalic.      Right Ear: External ear normal.      Left Ear: External ear normal.      Nose: Nose normal.   Eyes:      Pupils: Pupils are equal, round, and reactive to light.   Cardiovascular:      Rate and Rhythm: Normal rate and regular rhythm.      Heart sounds: Normal heart sounds.   Pulmonary:      Effort: Pulmonary effort is normal.      Breath sounds: Normal breath sounds.   Musculoskeletal:         General: Normal range of motion.      Cervical back: Normal range of motion.   Skin:     General: Skin is warm and dry.   Neurological:      General: No focal deficit present.      Mental Status: He is alert.   Psychiatric:         Mood and Affect: Mood normal.         Behavior: Behavior normal.         Thought Content: Thought content normal.          Result Review                   Assessment and Plan  Diagnoses and all orders for this visit:    1. Hypertension, essential (Primary)  Assessment & Plan:  Hypertension is unchanged.  Continue current treatment regimen.  Weight loss.  Regular aerobic exercise.  Ambulatory blood pressure monitoring.  Blood pressure will be reassessed at the next regular appointment.    Mild elevation today but no med changes - will recheck at followup in July.  A little stressed with urology visits/prostatitis/psa rise so we will just recheck BP at followup    Orders:  -     Comprehensive Metabolic Panel; Future  -     Lipid Panel; Future  -     metoprolol succinate XL (TOPROL-XL) 25 MG 24 hr tablet; Take 1 tablet by mouth Daily.  Dispense: 90 tablet; Refill: 2  -     amLODIPine-benazepril (LOTREL 5-10) 5-10 MG per capsule; Take 1 capsule by mouth Daily.  Dispense: 90 capsule; Refill: 2  -     Comprehensive Metabolic Panel  -     Lipid Panel    2. GERD without esophagitis  Assessment & Plan:  Continue omeprazole    Orders:  -     omeprazole (priLOSEC) 20 MG capsule; Take 1 capsule by mouth Daily As Needed (heartburn).  Dispense: 90 capsule; Refill: 2    3. Family history of  colon cancer  Assessment & Plan:  Discussed his brothers history of stage IV colon cancer.  He is now due for repeat screening colonoscopy in January 2028      4. Overweight (BMI 25.0-29.9)  Assessment & Plan:  Patient's (Body mass index is 25.84 kg/m².) indicates that they are overweight with health conditions that include hypertension and GERD . Weight is unchanged. BMI is is above average; BMI management plan is completed. We discussed portion control and increasing exercise.       5. BPH with obstruction/lower urinary tract symptoms  Assessment & Plan:  Cont flomax    Discussed proscar/avodart options and he will consider after PSA returns    If PSA is trending down plan to recheck in 6 mos but if not improving after tx for prostatitis he has plans for urology recheck    Orders:  -     tamsulosin (FLOMAX) 0.4 MG capsule 24 hr capsule; Take 1 capsule by mouth Every Night.  Dispense: 90 capsule; Refill: 2    6. Increased prostate specific antigen (PSA) velocity  Assessment & Plan:  See above    Tx for prostatitis in Nov with Bactrim x 3 wks    Symptoms resolved    PSA in Oct 3.61.    Awaiting recheck on PSA today     Orders:  -     PSA DIAGNOSTIC ONLY; Future  -     PSA DIAGNOSTIC ONLY    7. Chronic prostatitis  Assessment & Plan:  See above                    Follow Up  Return in 6 months (on 7/12/2024) for Annual physical.    Arnulfo Snowden MD

## 2024-01-08 NOTE — ASSESSMENT & PLAN NOTE
Patient's (Body mass index is 25.84 kg/m².) indicates that they are overweight with health conditions that include hypertension and GERD . Weight is unchanged. BMI is is above average; BMI management plan is completed. We discussed portion control and increasing exercise.

## 2024-01-08 NOTE — ASSESSMENT & PLAN NOTE
Cont flomax    Discussed proscar/avodart options and he will consider after PSA returns    If PSA is trending down plan to recheck in 6 mos but if not improving after tx for prostatitis he has plans for urology recheck

## 2024-01-08 NOTE — ASSESSMENT & PLAN NOTE
Hypertension is unchanged.  Continue current treatment regimen.  Weight loss.  Regular aerobic exercise.  Ambulatory blood pressure monitoring.  Blood pressure will be reassessed at the next regular appointment.    Mild elevation today but no med changes - will recheck at followup in July.  A little stressed with urology visits/prostatitis/psa rise so we will just recheck BP at followup

## 2024-01-08 NOTE — ASSESSMENT & PLAN NOTE
See above    Tx for prostatitis in Nov with Bactrim x 3 wks    Symptoms resolved    PSA in Oct 3.61.    Awaiting recheck on PSA today

## 2024-01-08 NOTE — ASSESSMENT & PLAN NOTE
Discussed his brothers history of stage IV colon cancer.  He is now due for repeat screening colonoscopy in January 2028

## 2024-01-09 LAB
ALBUMIN SERPL-MCNC: 4.1 G/DL (ref 3.8–4.9)
ALBUMIN/GLOB SERPL: 1.6 {RATIO} (ref 1.2–2.2)
ALP SERPL-CCNC: 97 IU/L (ref 44–121)
ALT SERPL-CCNC: 14 IU/L (ref 0–44)
AST SERPL-CCNC: 15 IU/L (ref 0–40)
BILIRUB SERPL-MCNC: 0.3 MG/DL (ref 0–1.2)
BUN SERPL-MCNC: 13 MG/DL (ref 6–24)
BUN/CREAT SERPL: 14 (ref 9–20)
CALCIUM SERPL-MCNC: 9.5 MG/DL (ref 8.7–10.2)
CHLORIDE SERPL-SCNC: 105 MMOL/L (ref 96–106)
CHOLEST SERPL-MCNC: 184 MG/DL (ref 100–199)
CO2 SERPL-SCNC: 21 MMOL/L (ref 20–29)
CREAT SERPL-MCNC: 0.93 MG/DL (ref 0.76–1.27)
EGFRCR SERPLBLD CKD-EPI 2021: 98 ML/MIN/1.73
GLOBULIN SER CALC-MCNC: 2.5 G/DL (ref 1.5–4.5)
GLUCOSE SERPL-MCNC: 89 MG/DL (ref 70–99)
HDLC SERPL-MCNC: 47 MG/DL
LDLC SERPL CALC-MCNC: 123 MG/DL (ref 0–99)
POTASSIUM SERPL-SCNC: 4.8 MMOL/L (ref 3.5–5.2)
PROT SERPL-MCNC: 6.6 G/DL (ref 6–8.5)
PSA SERPL-MCNC: 1.7 NG/ML (ref 0–4)
SODIUM SERPL-SCNC: 141 MMOL/L (ref 134–144)
TRIGL SERPL-MCNC: 74 MG/DL (ref 0–149)
VLDLC SERPL CALC-MCNC: 14 MG/DL (ref 5–40)

## 2024-07-16 ENCOUNTER — OFFICE VISIT (OUTPATIENT)
Dept: FAMILY MEDICINE CLINIC | Facility: CLINIC | Age: 54
End: 2024-07-16
Payer: COMMERCIAL

## 2024-07-16 ENCOUNTER — TELEPHONE (OUTPATIENT)
Dept: FAMILY MEDICINE CLINIC | Facility: CLINIC | Age: 54
End: 2024-07-16

## 2024-07-16 VITALS
HEIGHT: 67 IN | BODY MASS INDEX: 25.83 KG/M2 | SYSTOLIC BLOOD PRESSURE: 122 MMHG | WEIGHT: 164.6 LBS | DIASTOLIC BLOOD PRESSURE: 82 MMHG | OXYGEN SATURATION: 96 % | HEART RATE: 73 BPM

## 2024-07-16 DIAGNOSIS — N13.8 BPH WITH OBSTRUCTION/LOWER URINARY TRACT SYMPTOMS: Chronic | ICD-10-CM

## 2024-07-16 DIAGNOSIS — E66.3 OVERWEIGHT (BMI 25.0-29.9): ICD-10-CM

## 2024-07-16 DIAGNOSIS — L21.9 SEBORRHEA: ICD-10-CM

## 2024-07-16 DIAGNOSIS — Z13.1 DIABETES MELLITUS SCREENING: ICD-10-CM

## 2024-07-16 DIAGNOSIS — M62.838 MUSCLE SPASMS OF NECK: ICD-10-CM

## 2024-07-16 DIAGNOSIS — Z80.0 FAMILY HISTORY OF COLON CANCER: Chronic | ICD-10-CM

## 2024-07-16 DIAGNOSIS — Z12.5 PROSTATE CANCER SCREENING: ICD-10-CM

## 2024-07-16 DIAGNOSIS — N40.1 BPH WITH OBSTRUCTION/LOWER URINARY TRACT SYMPTOMS: Chronic | ICD-10-CM

## 2024-07-16 DIAGNOSIS — Z12.2 ENCOUNTER FOR SCREENING FOR LUNG CANCER: ICD-10-CM

## 2024-07-16 DIAGNOSIS — Z86.010 PERSONAL HISTORY OF COLONIC POLYPS: ICD-10-CM

## 2024-07-16 DIAGNOSIS — I10 HYPERTENSION, ESSENTIAL: Chronic | ICD-10-CM

## 2024-07-16 DIAGNOSIS — K21.9 GERD WITHOUT ESOPHAGITIS: Chronic | ICD-10-CM

## 2024-07-16 DIAGNOSIS — Z00.00 GENERAL MEDICAL EXAM: Primary | ICD-10-CM

## 2024-07-16 DIAGNOSIS — Z12.11 SCREENING FOR COLON CANCER: ICD-10-CM

## 2024-07-16 PROBLEM — R97.20 INCREASED PROSTATE SPECIFIC ANTIGEN (PSA) VELOCITY: Status: RESOLVED | Noted: 2024-01-08 | Resolved: 2024-07-16

## 2024-07-16 PROBLEM — N41.1 CHRONIC PROSTATITIS: Status: RESOLVED | Noted: 2024-01-08 | Resolved: 2024-07-16

## 2024-07-16 PROBLEM — R07.89 CHEST PAIN, ATYPICAL: Status: RESOLVED | Noted: 2019-03-21 | Resolved: 2024-07-16

## 2024-07-16 PROBLEM — Z86.0100 PERSONAL HISTORY OF COLONIC POLYPS: Status: ACTIVE | Noted: 2024-07-16

## 2024-07-16 PROCEDURE — 99396 PREV VISIT EST AGE 40-64: CPT | Performed by: FAMILY MEDICINE

## 2024-07-16 RX ORDER — METOPROLOL SUCCINATE 25 MG/1
25 TABLET, EXTENDED RELEASE ORAL DAILY
Qty: 90 TABLET | Refills: 2 | Status: SHIPPED | OUTPATIENT
Start: 2024-07-16

## 2024-07-16 RX ORDER — FLUOCINOLONE ACETONIDE 0.11 MG/ML
OIL AURICULAR (OTIC)
Qty: 20 ML | Refills: 5 | Status: SHIPPED | OUTPATIENT
Start: 2024-07-16

## 2024-07-16 RX ORDER — FLUOCINOLONE ACETONIDE 0.11 MG/ML
OIL AURICULAR (OTIC) 2 TIMES DAILY PRN
Qty: 20 ML | Refills: 5 | Status: SHIPPED | OUTPATIENT
Start: 2024-07-16 | End: 2024-07-16 | Stop reason: SDUPTHER

## 2024-07-16 RX ORDER — OMEPRAZOLE 20 MG/1
20 CAPSULE, DELAYED RELEASE ORAL DAILY PRN
Qty: 90 CAPSULE | Refills: 2 | Status: SHIPPED | OUTPATIENT
Start: 2024-07-16

## 2024-07-16 RX ORDER — TAMSULOSIN HYDROCHLORIDE 0.4 MG/1
1 CAPSULE ORAL NIGHTLY
Qty: 90 CAPSULE | Refills: 2 | Status: SHIPPED | OUTPATIENT
Start: 2024-07-16

## 2024-07-16 RX ORDER — AMLODIPINE BESYLATE AND BENAZEPRIL HYDROCHLORIDE 5; 10 MG/1; MG/1
1 CAPSULE ORAL DAILY
Qty: 90 CAPSULE | Refills: 2 | Status: SHIPPED | OUTPATIENT
Start: 2024-07-16

## 2024-07-16 NOTE — TELEPHONE ENCOUNTER
[facility] at [facility] called and she wanted to get how many drops did you want to put into each eye.  Please call at [phone]

## 2024-07-16 NOTE — ASSESSMENT & PLAN NOTE
Patient's (Body mass index is 25.78 kg/m².) indicates that they are overweight with health conditions that include hypertension and GERD . Weight is unchanged. BMI is is above average; BMI management plan is completed. We discussed portion control and increasing exercise.

## 2024-07-16 NOTE — ASSESSMENT & PLAN NOTE
Uptodate with colonoscopy in January 2023 with Dr. Rodríguez.  He did have a polyp removed that returned hyperplastic so recommendation was made to repeat his colonoscopy in 10 years.  Unfortunately, after his screening colonoscopy his brother was diagnosed with stage IV colon cancer.  He does understand that he is now due for repeat screening colonoscopy in 5 years due to his brother's history of colon cancer (Jan 2028).

## 2024-07-16 NOTE — ASSESSMENT & PLAN NOTE
Last year his PSA for prostate cancer screening returned normal July 2023 but had increased compared to past labs - he did see Urology and did have prostatitis treated with 3 wks of bactrim (after repeat PSA was up to 3.61 in Oct 2023) - repeat PSA returned normal at 1.7 in January 2024

## 2024-07-16 NOTE — ASSESSMENT & PLAN NOTE
Discussed and encouraged low-dose lung CT.  He voiced understanding and declines lung cancer screening.  We will continue to offer and encourage this in the future.

## 2024-07-16 NOTE — PROGRESS NOTES
Chief Complaint  Physical     Subjective    History of Present Illness:  Sunil Nolasco is a 54 y.o. male who presents today for physical exam and 6 month followup visit for medication refills and fasting labs.    Doing well since our last visit together in January but did have a tick bite and was treated with doxy for suspected Lyme disease.  Serology did return with one weak positive lyme ab and he did well with doxy treatment.    Uptodate with colonoscopy in January 2023 with Dr. Rodríguez.  He did have a polyp removed that returned hyperplastic so recommendation was made to repeat his colonoscopy in 10 years.  Unfortunately, after his screening colonoscopy his brother was diagnosed with stage IV colon cancer.  He does understand that he is now due for repeat screening colonoscopy in 5 years due to his brother's history of colon cancer (Jan 2028).     He is on Flomax given mild BPH discovered on rectal exam during his colonoscopy and urinary hesitancy.       Declines hep C screening.     Declines low-dose lung CT for lung cancer screening.  He did quit smoking about 9 years ago.     Discussed Shingrix and COVID booster.  He voiced understanding but declined      Last year his PSA for prostate cancer screening returned normal July 2023 but had increased compared to past labs - he did see Urology and did have prostatitis treated with 3 wks of bactrim (after repeat PSA was up to 3.61 in Oct 2023) - repeat PSA returned normal at 1.7 in January 2024.  Would like repeat psa with labs today    He continues with lotrel and metoprolol for BP control.     Good Cardiology workup in 2019 given atypical chest pain episodes. No episodes of chest pain since workup and restart on omeprazole for GERD. Still using omeprazole prn for flareups but not needing it everyday. No dysphagia.     Mild right ear itching/burning.  Eval at urgent care without infection seen - would like it checked today            Objective   Vital Signs:   BP  "122/82   Pulse 73   Ht 170.2 cm (67\")   Wt 74.7 kg (164 lb 9.6 oz)   SpO2 96%   BMI 25.78 kg/m²     Review of Systems   Constitutional:  Negative for appetite change, chills and fever.   HENT:  Negative for hearing loss.         See HPI, R ear canal irritation   Eyes:  Negative for blurred vision.   Respiratory:  Negative for chest tightness.    Cardiovascular:  Negative for chest pain.   Gastrointestinal:  Negative for abdominal pain.   Musculoskeletal:  Negative for gait problem.   Skin:  Negative for rash.   Psychiatric/Behavioral:  Negative for depressed mood.        Past History:  Medical History: has a past medical history of Allergic rhinitis, Anxiety, Arthritis (2005), Benign essential hypertension, Cat scratch fever, Colon polyp (2023), Diverticulosis (2023), Gastroesophageal reflux, HL (hearing loss) (2010), Hyperlipidemia, Hypertension, Irritable bowel syndrome, Kidney stone (1997), PONV (postoperative nausea and vomiting), and Tremor (1985).   Surgical History: has a past surgical history that includes [place] tooth extraction; Cardiac catheterization (N/A, 03/29/2019); Lymph node biopsy (1985); and Colonoscopy (2023).   Family History: family history includes Cancer in his brother; Coronary artery disease in his mother; Diabetes in his father; Hypertension in his mother.   Social History: reports that he quit smoking about 10 years ago. His smoking use included cigarettes. He started smoking about 40 years ago. He has a 45 pack-year smoking history. He has quit using smokeless tobacco. He reports current alcohol use of about 1.0 standard drink of alcohol per week. He reports that he does not use drugs.      Current Outpatient Medications:     amLODIPine-benazepril (LOTREL 5-10) 5-10 MG per capsule, Take 1 capsule by mouth Daily., Disp: 90 capsule, Rfl: 2    metoprolol succinate XL (TOPROL-XL) 25 MG 24 hr tablet, Take 1 tablet by mouth Daily., Disp: 90 tablet, Rfl: 2    omeprazole (priLOSEC) 20 MG " capsule, Take 1 capsule by mouth Daily As Needed (heartburn)., Disp: 90 capsule, Rfl: 2    tamsulosin (FLOMAX) 0.4 MG capsule 24 hr capsule, Take 1 capsule by mouth Every Night., Disp: 90 capsule, Rfl: 2    fluocinolone acetonide (DERMOTIC) 0.01 % oil otic oil, Administer  into both ears 2 (Two) Times a Day As Needed (ear canal itcing/pain)., Disp: 20 mL, Rfl: 5    Allergies: Morphine    Physical Exam  Constitutional:       Appearance: Normal appearance.   HENT:      Head: Normocephalic.      Right Ear: Tympanic membrane and external ear normal. There is no impacted cerumen.      Left Ear: Tympanic membrane, ear canal and external ear normal. There is no impacted cerumen.      Ears:      Comments: Mild R canal scaling/seborrhea, no evid for OM/OE     Nose: Nose normal.   Eyes:      Pupils: Pupils are equal, round, and reactive to light.   Cardiovascular:      Rate and Rhythm: Normal rate and regular rhythm.      Heart sounds: Normal heart sounds.   Pulmonary:      Effort: Pulmonary effort is normal.      Breath sounds: Normal breath sounds.   Musculoskeletal:         General: Normal range of motion.      Cervical back: Normal range of motion.   Skin:     General: Skin is warm and dry.   Neurological:      General: No focal deficit present.      Mental Status: He is alert.   Psychiatric:         Mood and Affect: Mood normal.         Behavior: Behavior normal.         Thought Content: Thought content normal.          Result Review                   Assessment and Plan  Diagnoses and all orders for this visit:    1. General medical exam (Primary)  Assessment & Plan:  Discussed together health maintenance and screening along with vaccination options and healthy diet and exercise habits as part of the preventative counseling at their physical exam today.     Orders:  -     CBC Auto Differential; Future  -     Comprehensive Metabolic Panel; Future  -     Lipid Panel; Future  -     TSH; Future  -     T4, Free; Future    2.  Diabetes mellitus screening  -     Hemoglobin A1c; Future    3. Prostate cancer screening  Assessment & Plan:  Last year his PSA for prostate cancer screening returned normal July 2023 but had increased compared to past labs - he did see Urology and did have prostatitis treated with 3 wks of bactrim (after repeat PSA was up to 3.61 in Oct 2023) - repeat PSA returned normal at 1.7 in January 2024    Orders:  -     PSA Screen; Future    4. Personal history of colonic polyps  Assessment & Plan:  Uptodate with colonoscopy in January 2023 with Dr. Rodríguez.  He did have a polyp removed that returned hyperplastic so recommendation was made to repeat his colonoscopy in 10 years.  Unfortunately, after his screening colonoscopy his brother was diagnosed with stage IV colon cancer.  He does understand that he is now due for repeat screening colonoscopy in 5 years due to his brother's history of colon cancer (Jan 2028).      5. Screening for colon cancer  Assessment & Plan:  Uptodate with colonoscopy in January 2023 with Dr. Rodríguez.  He did have a polyp removed that returned hyperplastic so recommendation was made to repeat his colonoscopy in 10 years.  Unfortunately, after his screening colonoscopy his brother was diagnosed with stage IV colon cancer.  He does understand that he is now due for repeat screening colonoscopy in 5 years due to his brother's history of colon cancer (Jan 2028).      6. Hypertension, essential  Assessment & Plan:  Hypertension is stable and controlled  Continue current treatment regimen.  Weight loss.  Regular aerobic exercise.  Blood pressure will be reassessed in 6 months.    Orders:  -     CBC Auto Differential; Future  -     Comprehensive Metabolic Panel; Future  -     Lipid Panel; Future  -     TSH; Future  -     T4, Free; Future  -     amLODIPine-benazepril (LOTREL 5-10) 5-10 MG per capsule; Take 1 capsule by mouth Daily.  Dispense: 90 capsule; Refill: 2  -     metoprolol succinate XL  (TOPROL-XL) 25 MG 24 hr tablet; Take 1 tablet by mouth Daily.  Dispense: 90 tablet; Refill: 2    7. GERD without esophagitis  Assessment & Plan:  Continue omeprazole    Orders:  -     omeprazole (priLOSEC) 20 MG capsule; Take 1 capsule by mouth Daily As Needed (heartburn).  Dispense: 90 capsule; Refill: 2    8. BPH with obstruction/lower urinary tract symptoms  Assessment & Plan:  Cont flomax    Wants to hold off on proscar/avodart at this time    Orders:  -     tamsulosin (FLOMAX) 0.4 MG capsule 24 hr capsule; Take 1 capsule by mouth Every Night.  Dispense: 90 capsule; Refill: 2    9. Encounter for screening for lung cancer  Assessment & Plan:  Discussed and encouraged low-dose lung CT.  He voiced understanding and declines lung cancer screening.  We will continue to offer and encourage this in the future.      10. Muscle spasms of neck  Assessment & Plan:  Doing well without need for prn flexeril for some time!       11. Family history of colon cancer  Assessment & Plan:  Uptodate with colonoscopy in January 2023 with Dr. Rodríguez.  He did have a polyp removed that returned hyperplastic so recommendation was made to repeat his colonoscopy in 10 years.  Unfortunately, after his screening colonoscopy his brother was diagnosed with stage IV colon cancer.  He does understand that he is now due for repeat screening colonoscopy in 5 years due to his brother's history of colon cancer (Jan 2028).      12. Seborrhea  Assessment & Plan:  Given trial with fluocinolone for canal seborrhea/eczema flareup    Orders:  -     fluocinolone acetonide (DERMOTIC) 0.01 % oil otic oil; Administer  into both ears 2 (Two) Times a Day As Needed (ear canal itcing/pain).  Dispense: 20 mL; Refill: 5    13. Overweight (BMI 25.0-29.9)  Assessment & Plan:  Patient's (Body mass index is 25.78 kg/m².) indicates that they are overweight with health conditions that include hypertension and GERD . Weight is unchanged. BMI is is above average; BMI  management plan is completed. We discussed portion control and increasing exercise.                      Follow Up  Return in about 6 months (around 1/16/2025) for Med recheck.    Arnulfo Snowden MD

## 2024-07-17 LAB
ALBUMIN SERPL-MCNC: 4.1 G/DL (ref 3.8–4.9)
ALP SERPL-CCNC: 96 IU/L (ref 44–121)
ALT SERPL-CCNC: 11 IU/L (ref 0–44)
AST SERPL-CCNC: 17 IU/L (ref 0–40)
BASOPHILS # BLD AUTO: 0.1 X10E3/UL (ref 0–0.2)
BASOPHILS NFR BLD AUTO: 1 %
BILIRUB SERPL-MCNC: 0.4 MG/DL (ref 0–1.2)
BUN SERPL-MCNC: 14 MG/DL (ref 6–24)
BUN/CREAT SERPL: 15 (ref 9–20)
CALCIUM SERPL-MCNC: 9.3 MG/DL (ref 8.7–10.2)
CHLORIDE SERPL-SCNC: 105 MMOL/L (ref 96–106)
CHOLEST SERPL-MCNC: 182 MG/DL (ref 100–199)
CO2 SERPL-SCNC: 21 MMOL/L (ref 20–29)
CREAT SERPL-MCNC: 0.95 MG/DL (ref 0.76–1.27)
EGFRCR SERPLBLD CKD-EPI 2021: 95 ML/MIN/1.73
EOSINOPHIL # BLD AUTO: 0.3 X10E3/UL (ref 0–0.4)
EOSINOPHIL NFR BLD AUTO: 3 %
ERYTHROCYTE [DISTWIDTH] IN BLOOD BY AUTOMATED COUNT: 12.4 % (ref 11.6–15.4)
GLOBULIN SER CALC-MCNC: 2.3 G/DL (ref 1.5–4.5)
GLUCOSE SERPL-MCNC: 89 MG/DL (ref 70–99)
HBA1C MFR BLD: 5.8 % (ref 4.8–5.6)
HCT VFR BLD AUTO: 47.8 % (ref 37.5–51)
HDLC SERPL-MCNC: 43 MG/DL
HGB BLD-MCNC: 15.8 G/DL (ref 13–17.7)
IMM GRANULOCYTES # BLD AUTO: 0 X10E3/UL (ref 0–0.1)
IMM GRANULOCYTES NFR BLD AUTO: 0 %
LDLC SERPL CALC-MCNC: 121 MG/DL (ref 0–99)
LYMPHOCYTES # BLD AUTO: 2.6 X10E3/UL (ref 0.7–3.1)
LYMPHOCYTES NFR BLD AUTO: 26 %
MCH RBC QN AUTO: 30.3 PG (ref 26.6–33)
MCHC RBC AUTO-ENTMCNC: 33.1 G/DL (ref 31.5–35.7)
MCV RBC AUTO: 92 FL (ref 79–97)
MONOCYTES # BLD AUTO: 0.6 X10E3/UL (ref 0.1–0.9)
MONOCYTES NFR BLD AUTO: 6 %
NEUTROPHILS # BLD AUTO: 6.3 X10E3/UL (ref 1.4–7)
NEUTROPHILS NFR BLD AUTO: 64 %
PLATELET # BLD AUTO: 225 X10E3/UL (ref 150–450)
POTASSIUM SERPL-SCNC: 4.6 MMOL/L (ref 3.5–5.2)
PROT SERPL-MCNC: 6.4 G/DL (ref 6–8.5)
PSA SERPL-MCNC: 1.4 NG/ML (ref 0–4)
RBC # BLD AUTO: 5.21 X10E6/UL (ref 4.14–5.8)
SODIUM SERPL-SCNC: 140 MMOL/L (ref 134–144)
T4 FREE SERPL-MCNC: 1.2 NG/DL (ref 0.82–1.77)
TRIGL SERPL-MCNC: 96 MG/DL (ref 0–149)
TSH SERPL DL<=0.005 MIU/L-ACNC: 1.44 UIU/ML (ref 0.45–4.5)
VLDLC SERPL CALC-MCNC: 18 MG/DL (ref 5–40)
WBC # BLD AUTO: 9.8 X10E3/UL (ref 3.4–10.8)

## 2024-07-18 ENCOUNTER — TELEPHONE (OUTPATIENT)
Dept: FAMILY MEDICINE CLINIC | Facility: CLINIC | Age: 54
End: 2024-07-18
Payer: COMMERCIAL

## 2024-07-18 NOTE — TELEPHONE ENCOUNTER
Caller: DR ROE    Relationship: Other    Best call back number: [phone]    What form or medical record are you requesting: PSA    Who is requesting this form or medical record from you: DR ROE OFFICE    How would you like to receive the form or medical records (pick-up, mail, fax): FAX  If fax, what is the fax number   If mail, what is the address:    If pick-up, provide patient with address and location details    Timeframe paperwork needed: AS SOON AS POSSIBLE    Additional notes:

## 2024-10-14 ENCOUNTER — TELEPHONE (OUTPATIENT)
Age: 54
End: 2024-10-14
Payer: COMMERCIAL

## 2024-10-14 NOTE — TELEPHONE ENCOUNTER
CALLED PT TO CONFIRM WHICH ARM HE GOT HIS FLU VACCINATION IN. PT ANSWERED AND CONFIRMED THAT HE RECEIVED HIS SHOT IN HIS RIGHT ARM.

## 2025-01-17 ENCOUNTER — OFFICE VISIT (OUTPATIENT)
Dept: FAMILY MEDICINE CLINIC | Facility: CLINIC | Age: 55
End: 2025-01-17
Payer: COMMERCIAL

## 2025-01-17 VITALS
SYSTOLIC BLOOD PRESSURE: 128 MMHG | OXYGEN SATURATION: 97 % | HEIGHT: 67 IN | BODY MASS INDEX: 27.34 KG/M2 | DIASTOLIC BLOOD PRESSURE: 82 MMHG | HEART RATE: 83 BPM | WEIGHT: 174.2 LBS

## 2025-01-17 DIAGNOSIS — N13.8 BPH WITH OBSTRUCTION/LOWER URINARY TRACT SYMPTOMS: Chronic | ICD-10-CM

## 2025-01-17 DIAGNOSIS — Z80.0 FAMILY HISTORY OF COLON CANCER: Chronic | ICD-10-CM

## 2025-01-17 DIAGNOSIS — E66.3 OVERWEIGHT (BMI 25.0-29.9): ICD-10-CM

## 2025-01-17 DIAGNOSIS — M62.838 MUSCLE SPASMS OF NECK: ICD-10-CM

## 2025-01-17 DIAGNOSIS — J18.9 WALKING PNEUMONIA: ICD-10-CM

## 2025-01-17 DIAGNOSIS — I10 HYPERTENSION, ESSENTIAL: Primary | Chronic | ICD-10-CM

## 2025-01-17 DIAGNOSIS — N40.1 BPH WITH OBSTRUCTION/LOWER URINARY TRACT SYMPTOMS: Chronic | ICD-10-CM

## 2025-01-17 DIAGNOSIS — K21.9 GERD WITHOUT ESOPHAGITIS: Chronic | ICD-10-CM

## 2025-01-17 RX ORDER — AMLODIPINE AND BENAZEPRIL HYDROCHLORIDE 5; 10 MG/1; MG/1
1 CAPSULE ORAL DAILY
Qty: 90 CAPSULE | Refills: 2 | Status: SHIPPED | OUTPATIENT
Start: 2025-01-17

## 2025-01-17 RX ORDER — METOPROLOL SUCCINATE 25 MG/1
25 TABLET, EXTENDED RELEASE ORAL DAILY
Qty: 90 TABLET | Refills: 2 | Status: SHIPPED | OUTPATIENT
Start: 2025-01-17

## 2025-01-17 RX ORDER — AZITHROMYCIN 250 MG/1
TABLET, FILM COATED ORAL
Qty: 6 TABLET | Refills: 0 | Status: SHIPPED | OUTPATIENT
Start: 2025-01-17

## 2025-01-17 RX ORDER — TAMSULOSIN HYDROCHLORIDE 0.4 MG/1
1 CAPSULE ORAL NIGHTLY
Qty: 90 CAPSULE | Refills: 2 | Status: SHIPPED | OUTPATIENT
Start: 2025-01-17

## 2025-01-17 NOTE — ASSESSMENT & PLAN NOTE
Patient's (Body mass index is 27.28 kg/m².) indicates that they are overweight with health conditions that include hypertension and GERD . Weight is unchanged. BMI is is above average; BMI management plan is completed. We discussed portion control and increasing exercise.

## 2025-01-17 NOTE — ASSESSMENT & PLAN NOTE
Cont flomax    Wants to hold off on proscar/avodart at this time    PSA screening up-to-date July 2024 and returned normal at 1.4

## 2025-01-17 NOTE — PROGRESS NOTES
Chief Complaint  Hypertension, GERD, BPH, exposure to walking pneumonia at home with ongoing congested cough    Subjective    History of Present Illness:  Sunil Nolasco is a 54 y.o. male who presents today for 6 month followup visit for medication refills    He has been having increased congested cough over the past 2 weeks and both his son and wife were diagnosed with walking pneumonia.  He had some doxycycline at home from a past prostate infection and did try this with ongoing symptoms.  No pleuritic pain.  No hemoptysis.  Negative COVID and flu testing.    Overall, he has been doing well and reports blood pressure readings at home have been stable with current regimen.  They are often a little higher with his initial read but do improve mid visit as they did again today.  We did discuss room to adjust his Lotrel if needed but overall his blood pressures have been well-controlled with his low dosing of Lotrel and metoprolol.    No chest pain, chest pressure, or shortness of breath.    GERD well-controlled with omeprazole with no dysphagia.    Continues to decline low-dose lung CT.    Uptodate with colonoscopy in January 2023 with Dr. Rodríguez.  He did have a polyp removed that returned hyperplastic so recommendation was made to repeat his colonoscopy in 10 years.  Unfortunately, after his screening colonoscopy his brother was diagnosed with stage IV colon cancer.  He does understand that he is now due for repeat screening colonoscopy in 5 years due to his brother's history of colon cancer (Jan 2028).     He is on Flomax given mild BPH      Declines low-dose lung CT for lung cancer screening.  He did quit smoking about 9 years ago.     Last year his PSA for prostate cancer screening returned normal July 2023 but had increased compared to past labs - he did see Urology and did have prostatitis treated with 3 wks of bactrim (after repeat PSA was up to 3.61 in Oct 2023) - repeat PSA returned normal at 1.7 in January  "2024.  PSA completed with his lab work 2024 did return normal 1.4    Good Cardiology workup in 2019 given atypical chest pain episodes. No episodes of chest pain since workup and restart on omeprazole for GERD. Still using omeprazole prn for flareups but not needing it everyday. No dysphagia.       Objective   Vital Signs:   /82   Pulse 83   Ht 170.2 cm (67\")   Wt 79 kg (174 lb 3.2 oz)   SpO2 97%   BMI 27.28 kg/m²     Review of Systems   Constitutional:  Negative for appetite change, chills and fever.   HENT:  Positive for congestion. Negative for hearing loss.    Eyes:  Negative for blurred vision.   Respiratory:  Positive for cough. Negative for chest tightness and shortness of breath.    Cardiovascular:  Negative for chest pain and palpitations.   Gastrointestinal:  Negative for abdominal pain.   Musculoskeletal:  Negative for gait problem.   Skin:  Negative for rash.   Psychiatric/Behavioral:  Negative for depressed mood.        Past History:  Medical History: has a past medical history of Allergic rhinitis, Anxiety, Arthritis (2005), Benign essential hypertension, Cat scratch fever, Colon polyp (2023), Diverticulosis (2023), Gastroesophageal reflux, HL (hearing loss) (2010), Hyperlipidemia, Hypertension, Irritable bowel syndrome, Kidney stone (1997), PONV (postoperative nausea and vomiting), and Tremor (1985).   Surgical History: has a past surgical history that includes [place] tooth extraction; Cardiac catheterization (N/A, 03/29/2019); Lymph node biopsy (1985); and Colonoscopy (2023).   Family History: family history includes Cancer in his brother; Coronary artery disease in his mother; Diabetes in his father; Hypertension in his mother.   Social History: reports that he quit smoking about 10 years ago. His smoking use included cigarettes. He started smoking about 40 years ago. He has a 45 pack-year smoking history. He has quit using smokeless tobacco. He reports current alcohol use of about 1.0 " standard drink of alcohol per week. He reports that he does not use drugs.      Current Outpatient Medications:     amLODIPine-benazepril (LOTREL 5-10) 5-10 MG per capsule, Take 1 capsule by mouth Daily., Disp: 90 capsule, Rfl: 2    metoprolol succinate XL (TOPROL-XL) 25 MG 24 hr tablet, Take 1 tablet by mouth Daily., Disp: 90 tablet, Rfl: 2    omeprazole (priLOSEC) 20 MG capsule, Take 1 capsule by mouth Daily As Needed (heartburn)., Disp: 90 capsule, Rfl: 2    tamsulosin (FLOMAX) 0.4 MG capsule 24 hr capsule, Take 1 capsule by mouth Every Night., Disp: 90 capsule, Rfl: 2    azithromycin (Zithromax Z-Wyatt) 250 MG tablet, Take 2 tablets by mouth on day 1, then 1 tablet daily on days 2-5, Disp: 6 tablet, Rfl: 0    Allergies: Morphine    Physical Exam  Constitutional:       Appearance: Normal appearance.   HENT:      Head: Normocephalic.      Right Ear: External ear normal.      Left Ear: External ear normal.      Nose: Nose normal.   Eyes:      Pupils: Pupils are equal, round, and reactive to light.   Cardiovascular:      Rate and Rhythm: Normal rate and regular rhythm.      Heart sounds: Normal heart sounds.   Pulmonary:      Effort: Pulmonary effort is normal.      Breath sounds: Rhonchi present. No wheezing.      Comments: Scattered rhonchi.  No wheezing.  No pleuritic pain.  Musculoskeletal:         General: Normal range of motion.      Cervical back: Normal range of motion.   Skin:     General: Skin is warm and dry.   Neurological:      General: No focal deficit present.      Mental Status: He is alert.   Psychiatric:         Mood and Affect: Mood normal.         Behavior: Behavior normal.         Thought Content: Thought content normal.          Result Review                   Assessment and Plan  Diagnoses and all orders for this visit:    1. Hypertension, essential (Primary)  Assessment & Plan:  Hypertension is stable and controlled  Continue current treatment regimen.  Weight loss.  Regular aerobic  exercise.  Blood pressure will be reassessed in 6 months.    Orders:  -     amLODIPine-benazepril (LOTREL 5-10) 5-10 MG per capsule; Take 1 capsule by mouth Daily.  Dispense: 90 capsule; Refill: 2  -     metoprolol succinate XL (TOPROL-XL) 25 MG 24 hr tablet; Take 1 tablet by mouth Daily.  Dispense: 90 tablet; Refill: 2    2. GERD without esophagitis  Assessment & Plan:  Continue omeprazole    Orders:  -     omeprazole (priLOSEC) 20 MG capsule; Take 1 capsule by mouth Daily As Needed (heartburn).  Dispense: 90 capsule; Refill: 2    3. Walking pneumonia  Assessment & Plan:  Treat with Z-Wyatt.  Plan for chest x-ray if not resolving.    Orders:  -     azithromycin (Zithromax Z-Wyatt) 250 MG tablet; Take 2 tablets by mouth on day 1, then 1 tablet daily on days 2-5  Dispense: 6 tablet; Refill: 0    4. BPH with obstruction/lower urinary tract symptoms  Assessment & Plan:  Cont flomax    Wants to hold off on proscar/avodart at this time    PSA screening up-to-date July 2024 and returned normal at 1.4    Orders:  -     tamsulosin (FLOMAX) 0.4 MG capsule 24 hr capsule; Take 1 capsule by mouth Every Night.  Dispense: 90 capsule; Refill: 2    5. Family history of colon cancer  Assessment & Plan:  Uptodate with colonoscopy in January 2023 with Dr. Rodríguez.  He did have a polyp removed that returned hyperplastic so recommendation was made to repeat his colonoscopy in 10 years.  Unfortunately, after his screening colonoscopy his brother was diagnosed with stage IV colon cancer.  He does understand that he is now due for repeat screening colonoscopy in 5 years due to his brother's history of colon cancer (Jan 2028).      6. Muscle spasms of neck  Assessment & Plan:  Doing well without need for prn flexeril for some time!       7. Overweight (BMI 25.0-29.9)  Assessment & Plan:  Patient's (Body mass index is 27.28 kg/m².) indicates that they are overweight with health conditions that include hypertension and GERD . Weight is unchanged.  BMI is is above average; BMI management plan is completed. We discussed portion control and increasing exercise.                      Follow Up  Return in about 6 months (around 7/17/2025) for Annual physical.    Arnulfo Snowden MD

## 2025-07-23 ENCOUNTER — OFFICE VISIT (OUTPATIENT)
Dept: FAMILY MEDICINE CLINIC | Facility: CLINIC | Age: 55
End: 2025-07-23
Payer: COMMERCIAL

## 2025-07-23 VITALS
OXYGEN SATURATION: 96 % | WEIGHT: 170.4 LBS | HEIGHT: 67 IN | DIASTOLIC BLOOD PRESSURE: 86 MMHG | BODY MASS INDEX: 26.74 KG/M2 | HEART RATE: 72 BPM | SYSTOLIC BLOOD PRESSURE: 124 MMHG

## 2025-07-23 DIAGNOSIS — Z13.1 DIABETES MELLITUS SCREENING: ICD-10-CM

## 2025-07-23 DIAGNOSIS — N40.1 BPH WITH OBSTRUCTION/LOWER URINARY TRACT SYMPTOMS: Chronic | ICD-10-CM

## 2025-07-23 DIAGNOSIS — Z00.00 GENERAL MEDICAL EXAM: Primary | ICD-10-CM

## 2025-07-23 DIAGNOSIS — I10 HYPERTENSION, ESSENTIAL: ICD-10-CM

## 2025-07-23 DIAGNOSIS — K21.9 GERD WITHOUT ESOPHAGITIS: Chronic | ICD-10-CM

## 2025-07-23 DIAGNOSIS — Z12.5 PROSTATE CANCER SCREENING: ICD-10-CM

## 2025-07-23 DIAGNOSIS — Z86.0100 HISTORY OF COLONIC POLYPS: ICD-10-CM

## 2025-07-23 DIAGNOSIS — E66.3 OVERWEIGHT (BMI 25.0-29.9): ICD-10-CM

## 2025-07-23 DIAGNOSIS — N13.8 BPH WITH OBSTRUCTION/LOWER URINARY TRACT SYMPTOMS: Chronic | ICD-10-CM

## 2025-07-23 DIAGNOSIS — Z23 NEED FOR PROPHYLACTIC VACCINATION AGAINST STREPTOCOCCUS PNEUMONIAE (PNEUMOCOCCUS): ICD-10-CM

## 2025-07-23 DIAGNOSIS — Z87.891 PERSONAL HISTORY OF TOBACCO USE: ICD-10-CM

## 2025-07-23 DIAGNOSIS — Z80.0 FAMILY HISTORY OF COLON CANCER: Chronic | ICD-10-CM

## 2025-07-23 DIAGNOSIS — Z12.11 SCREENING FOR COLON CANCER: ICD-10-CM

## 2025-07-23 PROBLEM — L21.9 SEBORRHEA: Status: RESOLVED | Noted: 2024-07-16 | Resolved: 2025-07-23

## 2025-07-23 PROBLEM — J18.9 WALKING PNEUMONIA: Status: RESOLVED | Noted: 2025-01-17 | Resolved: 2025-07-23

## 2025-07-23 PROCEDURE — 90471 IMMUNIZATION ADMIN: CPT | Performed by: FAMILY MEDICINE

## 2025-07-23 PROCEDURE — 90684 PCV21 VACCINE IM: CPT | Performed by: FAMILY MEDICINE

## 2025-07-23 PROCEDURE — 99396 PREV VISIT EST AGE 40-64: CPT | Performed by: FAMILY MEDICINE

## 2025-07-23 RX ORDER — TAMSULOSIN HYDROCHLORIDE 0.4 MG/1
1 CAPSULE ORAL NIGHTLY
Qty: 90 CAPSULE | Refills: 2 | Status: SHIPPED | OUTPATIENT
Start: 2025-07-23

## 2025-07-23 RX ORDER — METOPROLOL SUCCINATE 25 MG/1
25 TABLET, EXTENDED RELEASE ORAL DAILY
Qty: 90 TABLET | Refills: 2 | Status: SHIPPED | OUTPATIENT
Start: 2025-07-23

## 2025-07-23 RX ORDER — OMEPRAZOLE 20 MG/1
20 CAPSULE, DELAYED RELEASE ORAL DAILY
Qty: 90 CAPSULE | Refills: 2 | Status: SHIPPED | OUTPATIENT
Start: 2025-07-23

## 2025-07-23 RX ORDER — AMLODIPINE AND BENAZEPRIL HYDROCHLORIDE 5; 10 MG/1; MG/1
1 CAPSULE ORAL DAILY
Qty: 90 CAPSULE | Refills: 2 | Status: SHIPPED | OUTPATIENT
Start: 2025-07-23

## 2025-07-23 NOTE — ASSESSMENT & PLAN NOTE
Patient's (Body mass index is 26.69 kg/m².) indicates that they are overweight with health conditions that include hypertension and GERD . Weight is unchanged. BMI is is above average; BMI management plan is completed. We discussed portion control and increasing exercise.

## 2025-07-23 NOTE — PROGRESS NOTES
Chief Complaint  Physical     Hypertension, GERD, BPH      Subjective    History of Present Illness:  Sunil Nolasco is a 55 y.o. male who presents today for physical and 6 month followup visit.     Doing well since last visit together in Jan 2025.     Recent covid19 infection this month, feeling better - about 2 weeks ago.    Problems with riding the tractor/mower and symptoms concerning for recurrent prostatitis.  Had urinalysis with urology and was treated with cipro in June 2025. Completed 14 day course 1st week of July.     Overall, he has been doing well and reports blood pressure readings at home have been stable with current regimen.  They are often a little higher with his initial check here - but do improve mid visit as they did again today.  We did discuss room to adjust his Lotrel if needed but overall his blood pressures have been well-controlled with his low dosing of Lotrel and metoprolol.    No chest pain, chest pressure, or shortness of breath.    GERD well-controlled with omeprazole with no dysphagia.    Continues to decline low-dose lung CT. we did discuss this again today.    Uptodate with colonoscopy in January 2023 with Dr. Rodríguez.  He did have a polyp removed that returned hyperplastic so recommendation was made to repeat his colonoscopy in 10 years.  Unfortunately, after his screening colonoscopy his brother was diagnosed with stage IV colon cancer.  He does understand that he is now due for repeat screening colonoscopy in 5 years due to his brother's history of colon cancer (Jan 2028).     He is on Flomax given mild BPH      Declines low-dose lung CT for lung cancer screening.  He did quit smoking about 10 years ago.     PSA for prostate cancer screening returned normal July 2023 but had increased compared to past labs - he did see Urology and did have prostatitis treated with 3 wks of bactrim (after repeat PSA was up to 3.61 in Oct 2023) - repeat PSA returned normal at 1.7 in January 2024.   "PSA completed with his lab work 2024 did return normal 1.4.  Ready for yearly PSA with labs today.  Understands recent prostatitis may affect PSA.     Good Cardiology workup in 2019 given atypical chest pain episodes. No episodes of chest pain since workup and restart on omeprazole for GERD. Still using omeprazole prn for flareups but not needing it everyday. No dysphagia.     Objective   Vital Signs:   /86 (BP Location: Left arm, Patient Position: Sitting, Cuff Size: Adult)   Pulse 72   Ht 170.2 cm (67\")   Wt 77.3 kg (170 lb 6.4 oz)   SpO2 96%   BMI 26.69 kg/m²     Review of Systems   Constitutional:  Negative for appetite change, chills and fever.   HENT:  Negative for hearing loss.    Eyes:  Negative for blurred vision.   Respiratory:  Negative for chest tightness.    Cardiovascular:  Negative for chest pain.   Gastrointestinal:  Negative for abdominal pain.   Genitourinary:         See HPI.  Prostatitis symptoms resolved after treatment with Cipro completing therapy earlier this month.   Musculoskeletal:  Positive for arthralgias and neck stiffness. Negative for gait problem.   Skin:  Negative for rash.   Psychiatric/Behavioral:  Negative for depressed mood.        Past History:  Medical History: has a past medical history of Allergic rhinitis, Anxiety, Arthritis (2005), Benign essential hypertension, Cat scratch fever, Colon polyp (2023), Diverticulosis (2023), Gastroesophageal reflux, HL (hearing loss) (2010), Hyperlipidemia, Hypertension, Irritable bowel syndrome, Kidney stone (1997), PONV (postoperative nausea and vomiting), Prostatitis, and Tremor (1985).   Surgical History: has a past surgical history that includes Cisco tooth extraction; Cardiac catheterization (N/A, 03/29/2019); Lymph node biopsy (1985); and Colonoscopy (2023).   Family History: family history includes Cancer in his brother; Coronary artery disease in his mother; Diabetes in his father; Hypertension in his mother.   Social " History: reports that he quit smoking about 11 years ago. His smoking use included cigarettes. He started smoking about 41 years ago. He has a 45 pack-year smoking history. He has quit using smokeless tobacco. He reports current alcohol use of about 1.0 standard drink of alcohol per week. He reports that he does not use drugs.      Current Outpatient Medications:     amLODIPine-benazepril (LOTREL 5-10) 5-10 MG per capsule, Take 1 capsule by mouth Daily., Disp: 90 capsule, Rfl: 2    metoprolol succinate XL (TOPROL-XL) 25 MG 24 hr tablet, Take 1 tablet by mouth Daily., Disp: 90 tablet, Rfl: 2    omeprazole (priLOSEC) 20 MG capsule, Take 1 capsule by mouth Daily., Disp: 90 capsule, Rfl: 2    tamsulosin (FLOMAX) 0.4 MG capsule 24 hr capsule, Take 1 capsule by mouth Every Night., Disp: 90 capsule, Rfl: 2    Allergies: Morphine    Physical Exam  Constitutional:       Appearance: Normal appearance.   HENT:      Head: Normocephalic.      Right Ear: Tympanic membrane, ear canal and external ear normal.      Left Ear: Tympanic membrane, ear canal and external ear normal.      Nose: Nose normal.      Mouth/Throat:      Mouth: Mucous membranes are moist.      Pharynx: Oropharynx is clear.   Eyes:      Extraocular Movements: Extraocular movements intact.      Conjunctiva/sclera: Conjunctivae normal.      Pupils: Pupils are equal, round, and reactive to light.   Cardiovascular:      Rate and Rhythm: Normal rate and regular rhythm.      Heart sounds: Normal heart sounds. No murmur heard.     No friction rub. No gallop.   Pulmonary:      Effort: Pulmonary effort is normal.      Breath sounds: Normal breath sounds.   Abdominal:      General: Abdomen is flat.      Palpations: Abdomen is soft.   Musculoskeletal:      Cervical back: Normal range of motion.      Comments: Mild osteoarthritic symptoms.  No inflammatory joint changes or tophi.   Skin:     General: Skin is warm and dry.   Neurological:      General: No focal deficit  present.      Mental Status: He is alert and oriented to person, place, and time.      Motor: No weakness.      Gait: Gait normal.   Psychiatric:         Mood and Affect: Mood normal.         Behavior: Behavior normal.         Thought Content: Thought content normal.         Judgment: Judgment normal.          Result Review                   Assessment and Plan  Diagnoses and all orders for this visit:    1. General medical exam (Primary)  Assessment & Plan:  Discussed together health maintenance and screening along with vaccination options and healthy diet and exercise habits as part of the preventative counseling at their physical exam today.     Updated vaccinations with PCV21    Declines low-dose lung CT - will continue to offer and encourage at followup    Orders:  -     CBC & Differential  -     Comprehensive Metabolic Panel  -     Lipid Panel  -     TSH  -     T4, Free    2. Diabetes mellitus screening  -     Hemoglobin A1c    3. Prostate cancer screening  -     PSA Screen    4. Screening for colon cancer  Assessment & Plan:  Uptodate with colonoscopy in January 2023 with Dr. Rodríguez.  He did have a polyp removed that returned hyperplastic so recommendation was made to repeat his colonoscopy in 10 years.  Unfortunately, after his screening colonoscopy his brother was diagnosed with stage IV colon cancer.  He does understand that he is now due for repeat screening colonoscopy in 5 years due to his brother's history of colon cancer (Jan 2028).      5. Personal history of colonic polyps  Assessment & Plan:  Uptodate with colonoscopy in January 2023 with Dr. Rodríguez.  He did have a polyp removed that returned hyperplastic so recommendation was made to repeat his colonoscopy in 10 years.  Unfortunately, after his screening colonoscopy his brother was diagnosed with stage IV colon cancer.  He does understand that he is now due for repeat screening colonoscopy in 5 years due to his brother's history of colon cancer  (Jan 2028).      6. Family history of colon cancer  Assessment & Plan:  Uptodate with colonoscopy in January 2023 with Dr. Rodríguez.  He did have a polyp removed that returned hyperplastic so recommendation was made to repeat his colonoscopy in 10 years.  Unfortunately, after his screening colonoscopy his brother was diagnosed with stage IV colon cancer.  He does understand that he is now due for repeat screening colonoscopy in 5 years due to his brother's history of colon cancer (Jan 2028).      7. Personal history of tobacco use  Assessment & Plan:  Discussed and encouraged low-dose lung CT.  He voiced understanding and declines lung cancer screening.  We will continue to offer and encourage this in the future.      8. Overweight (BMI 25.0-29.9)  Assessment & Plan:  Patient's (Body mass index is 26.69 kg/m².) indicates that they are overweight with health conditions that include hypertension and GERD . Weight is unchanged. BMI is is above average; BMI management plan is completed. We discussed portion control and increasing exercise.       9. Hypertension, essential  Assessment & Plan:  Hypertension is stable and controlled  Continue current treatment regimen.  Weight loss.  Regular aerobic exercise.  Blood pressure will be reassessed in 6 months.    Orders:  -     amLODIPine-benazepril (LOTREL 5-10) 5-10 MG per capsule; Take 1 capsule by mouth Daily.  Dispense: 90 capsule; Refill: 2  -     metoprolol succinate XL (TOPROL-XL) 25 MG 24 hr tablet; Take 1 tablet by mouth Daily.  Dispense: 90 tablet; Refill: 2    10. GERD without esophagitis  Assessment & Plan:  Continue omeprazole    Orders:  -     omeprazole (priLOSEC) 20 MG capsule; Take 1 capsule by mouth Daily.  Dispense: 90 capsule; Refill: 2    11. BPH with obstruction/lower urinary tract symptoms  Assessment & Plan:  Cont flomax    Wants to hold off on proscar/avodart at this time    PSA screening ordered with labs.  We did discuss his recent episode of  prostatitis may affect his PSA reading and we may need to get a follow-up level.  He voiced understanding would like to go ahead with PSA today.        Orders:  -     tamsulosin (FLOMAX) 0.4 MG capsule 24 hr capsule; Take 1 capsule by mouth Every Night.  Dispense: 90 capsule; Refill: 2    12. Need for prophylactic vaccination against Streptococcus pneumoniae (pneumococcus)  -     Pneumococcal Conjugate Vaccine 21 (18+ yrs)                   Follow Up  Return in about 6 months (around 1/23/2026) for Med recheck.    Arnulfo Snowden MD

## 2025-07-23 NOTE — ASSESSMENT & PLAN NOTE
Discussed together health maintenance and screening along with vaccination options and healthy diet and exercise habits as part of the preventative counseling at their physical exam today.     Updated vaccinations with PCV21    Declines low-dose lung CT - will continue to offer and encourage at followup

## 2025-07-23 NOTE — ASSESSMENT & PLAN NOTE
Cont flomax    Wants to hold off on proscar/avodart at this time    PSA screening ordered with labs.  We did discuss his recent episode of prostatitis may affect his PSA reading and we may need to get a follow-up level.  He voiced understanding would like to go ahead with PSA today.

## 2025-07-24 LAB
ALBUMIN SERPL-MCNC: 4.4 G/DL (ref 3.5–5.2)
ALBUMIN/GLOB SERPL: 1.8 G/DL
ALP SERPL-CCNC: 92 U/L (ref 39–117)
ALT SERPL-CCNC: 14 U/L (ref 1–41)
AST SERPL-CCNC: 19 U/L (ref 1–40)
BASOPHILS # BLD AUTO: 0.12 10*3/MM3 (ref 0–0.2)
BASOPHILS NFR BLD AUTO: 1 % (ref 0–1.5)
BILIRUB SERPL-MCNC: 0.5 MG/DL (ref 0–1.2)
BUN SERPL-MCNC: 11 MG/DL (ref 6–20)
BUN/CREAT SERPL: 11.2 (ref 7–25)
CALCIUM SERPL-MCNC: 9.6 MG/DL (ref 8.6–10.5)
CHLORIDE SERPL-SCNC: 107 MMOL/L (ref 98–107)
CHOLEST SERPL-MCNC: 188 MG/DL (ref 0–200)
CO2 SERPL-SCNC: 25.4 MMOL/L (ref 22–29)
CREAT SERPL-MCNC: 0.98 MG/DL (ref 0.76–1.27)
EGFRCR SERPLBLD CKD-EPI 2021: 91.1 ML/MIN/1.73
EOSINOPHIL # BLD AUTO: 0.15 10*3/MM3 (ref 0–0.4)
EOSINOPHIL NFR BLD AUTO: 1.3 % (ref 0.3–6.2)
ERYTHROCYTE [DISTWIDTH] IN BLOOD BY AUTOMATED COUNT: 12.5 % (ref 12.3–15.4)
GLOBULIN SER CALC-MCNC: 2.5 GM/DL
GLUCOSE SERPL-MCNC: 95 MG/DL (ref 65–99)
HBA1C MFR BLD: 5.3 % (ref 4.8–5.6)
HCT VFR BLD AUTO: 46.5 % (ref 37.5–51)
HDLC SERPL-MCNC: 45 MG/DL (ref 40–60)
HGB BLD-MCNC: 15.4 G/DL (ref 13–17.7)
IMM GRANULOCYTES # BLD AUTO: 0.07 10*3/MM3 (ref 0–0.05)
IMM GRANULOCYTES NFR BLD AUTO: 0.6 % (ref 0–0.5)
LDLC SERPL CALC-MCNC: 128 MG/DL (ref 0–100)
LYMPHOCYTES # BLD AUTO: 2.44 10*3/MM3 (ref 0.7–3.1)
LYMPHOCYTES NFR BLD AUTO: 21.2 % (ref 19.6–45.3)
MCH RBC QN AUTO: 30.6 PG (ref 26.6–33)
MCHC RBC AUTO-ENTMCNC: 33.1 G/DL (ref 31.5–35.7)
MCV RBC AUTO: 92.4 FL (ref 79–97)
MONOCYTES # BLD AUTO: 0.57 10*3/MM3 (ref 0.1–0.9)
MONOCYTES NFR BLD AUTO: 5 % (ref 5–12)
NEUTROPHILS # BLD AUTO: 8.14 10*3/MM3 (ref 1.7–7)
NEUTROPHILS NFR BLD AUTO: 70.9 % (ref 42.7–76)
NRBC BLD AUTO-RTO: 0 /100 WBC (ref 0–0.2)
PLATELET # BLD AUTO: 273 10*3/MM3 (ref 140–450)
POTASSIUM SERPL-SCNC: 4.6 MMOL/L (ref 3.5–5.2)
PROT SERPL-MCNC: 6.9 G/DL (ref 6–8.5)
PSA SERPL-MCNC: 0.96 NG/ML (ref 0–4)
RBC # BLD AUTO: 5.03 10*6/MM3 (ref 4.14–5.8)
SODIUM SERPL-SCNC: 142 MMOL/L (ref 136–145)
T4 FREE SERPL-MCNC: 1.19 NG/DL (ref 0.92–1.68)
TRIGL SERPL-MCNC: 82 MG/DL (ref 0–150)
TSH SERPL DL<=0.005 MIU/L-ACNC: 1.33 UIU/ML (ref 0.27–4.2)
VLDLC SERPL CALC-MCNC: 15 MG/DL (ref 5–40)
WBC # BLD AUTO: 11.49 10*3/MM3 (ref 3.4–10.8)

## (undated) DEVICE — MODEL BT2000 P/N 700287-012KIT CONTENTS: MANIFOLD WITH SALINE AND CONTRAST PORTS, SALINE TUBING WITH SPIKE AND HAND SYRINGE, TRANSDUCER: Brand: BT2000 AUTOMATED MANIFOLD KIT

## (undated) DEVICE — CATH DIAG EXPO M/ PK 6FR FL4/FR4 PIG 3PK

## (undated) DEVICE — GW FC FLOP/TP .035 260CM 3MM

## (undated) DEVICE — DEV COMP RAD PRELUDESYNC 24CM

## (undated) DEVICE — CATH DIAG EXPO .056 FL3.5 6F 100CM

## (undated) DEVICE — RADIFOCUS GLIDEWIRE: Brand: GLIDEWIRE

## (undated) DEVICE — INTRO SHEATH PRELUDE EASE HC .025 6F 11CM

## (undated) DEVICE — MODEL AT P65, P/N 701554-001KIT CONTENTS: HAND CONTROLLER, 3-WAY HIGH-PRESSURE STOPCOCK WITH ROTATING END AND PREMIUM HIGH-PRESSURE TUBING: Brand: ANGIOTOUCH® KIT

## (undated) DEVICE — PK CATH CARD 10